# Patient Record
Sex: MALE | Race: WHITE
[De-identification: names, ages, dates, MRNs, and addresses within clinical notes are randomized per-mention and may not be internally consistent; named-entity substitution may affect disease eponyms.]

---

## 2017-09-15 ENCOUNTER — HOSPITAL ENCOUNTER (OUTPATIENT)
Dept: HOSPITAL 47 - RADCTMAIN | Age: 77
Discharge: HOME | End: 2017-09-15
Payer: COMMERCIAL

## 2017-09-15 DIAGNOSIS — C34.11: Primary | ICD-10-CM

## 2017-09-15 LAB
BUN SERPL-SCNC: 24 MG/DL (ref 9–20)
NON-AFRICAN AMERICAN GFR(MDRD): 48

## 2017-09-15 PROCEDURE — 74177 CT ABD & PELVIS W/CONTRAST: CPT

## 2017-09-15 PROCEDURE — 82565 ASSAY OF CREATININE: CPT

## 2017-09-15 PROCEDURE — 71260 CT THORAX DX C+: CPT

## 2017-09-15 PROCEDURE — 84520 ASSAY OF UREA NITROGEN: CPT

## 2017-09-15 PROCEDURE — 36415 COLL VENOUS BLD VENIPUNCTURE: CPT

## 2017-09-15 NOTE — CT
EXAMINATION TYPE: CT ChestAbdPelvis w con

 

DATE OF EXAM: 9/15/2017

 

COMPARISON: NONE

 

HISTORY: Lung cancer

 

CT DLP: 1226 mGycm. Automated Exposure Control for Dose Reduction was Utilized.

 

 

CONTRAST: 

CT scan of the thorax, abdomen and pelvis is performed with IV Contrast, patient injected with 80 ml 
mL of Visipaque 320.

 

FINDINGS:

 

LUNGS: Mild centrilobular and paraseptal emphysematous changes are appreciated. Right-sided volume lo
ss is present. Likely inspissated debris is seen within the right lateral trachea dependently on seri
es 3 image 14. There is a focal consolidation within the right lower lobe adjacent smaller consolidat
ion measured on soft tissue windows series 7 image 11 measuring 6.9 x 4.7 cm and 2.2 x 2.0 cm respect
ively. Just superior to this there is a spiculated nodule along the interlobar fissure measuring 1.2 
x 0.9 cm. Additional spiculated nodule measures approximately 0.6 x 0.9 cm on series 4 image 39 exten
ding cephalad. Rounded nodule within the right upper lobe measures 6 mm on series 4 image 30. Patient
's known lung cancer is thought to be located within the right lower lobe consolidation with numerous
 adjacent areas of spiculation that could relate to scarring and or satellite nodules. Left lung lacey
ins clear without discrete pulmonary nodule or mass.

 

MEDIASTINUM: There are no greater than 1 cm hilar or mediastinal lymph nodes.   No pericardial effusi
on is seen.  Coronary artery calcifications and mitral annulus calcifications are present.

 

LIVER/GB: No significant abnormality is appreciated.

 

PANCREAS: No significant abnormality is seen.

 

SPLEEN: No significant abnormality is seen.

 

ADRENALS: No significant abnormality is seen.

 

KIDNEYS: Numerous right-sided simple appearing renal cysts are seen as well as exophytic lower pole r
enal cysts are also seen. Protuberant area on the left superior pole anteriorly is thought to relate 
to renal lobulation.

 

BOWEL:  No significant abnormality is seen. Small hiatal hernia is present. Scattered colonic diverti
cula are present without pericolonic fat stranding.

 

GENITAL ORGANS: No gross abnormality seen.

 

LYMPH NODES: No greater than 1cm abdominal or pelvic lymph nodes are appreciated.

 

OSSEOUS STRUCTURES: No significant abnormality is seen. No suspicious osseous lesions. Mild degenerat
ryan changes of the thoracolumbar and lumbosacral spine are present.

 

OTHER: Circumferential calcifications are seen of the abdominal aorta and its branches, moderate in d
egree. Incidental note is made of bilateral retroareolar gynecomastia.

 

IMPRESSION:

1. Right lower lobe multifocal consolidation, presumed to contain the primary known lung carcinoma wi
th surrounding atelectasis and numerous areas of subcentimeter spiculation which could represent scar
ring and/or satellite pulmonary nodules. Additional more circumscribed right upper lobe 6 mm pulmonar
y nodule is seen. Comparison with any prior imaging would be of benefit to determine stability.  No l
eft-sided pulmonary nodules.

2. No evidence of mediastinal or axillary adenopathy.

3. No evidence of visceral or osseous metastasis within the abdomen or pelvis.

## 2017-10-03 ENCOUNTER — HOSPITAL ENCOUNTER (OUTPATIENT)
Dept: HOSPITAL 47 - LABWHC1 | Age: 77
Discharge: HOME | End: 2017-10-03
Payer: COMMERCIAL

## 2017-10-03 DIAGNOSIS — M54.5: ICD-10-CM

## 2017-10-03 DIAGNOSIS — Z01.812: Primary | ICD-10-CM

## 2017-10-03 LAB
BUN SERPL-SCNC: 31 MG/DL (ref 9–20)
NON-AFRICAN AMERICAN GFR(MDRD): 46

## 2017-10-03 PROCEDURE — 84520 ASSAY OF UREA NITROGEN: CPT

## 2017-10-03 PROCEDURE — 82565 ASSAY OF CREATININE: CPT

## 2017-10-03 PROCEDURE — 36415 COLL VENOUS BLD VENIPUNCTURE: CPT

## 2017-10-04 ENCOUNTER — HOSPITAL ENCOUNTER (OUTPATIENT)
Dept: HOSPITAL 47 - RADMRIMAIN | Age: 77
Discharge: HOME | End: 2017-10-04
Payer: COMMERCIAL

## 2017-10-04 ENCOUNTER — HOSPITAL ENCOUNTER (OUTPATIENT)
Dept: HOSPITAL 47 - LABWHC1 | Age: 77
Discharge: HOME | End: 2017-10-04
Payer: COMMERCIAL

## 2017-10-04 DIAGNOSIS — E55.9: ICD-10-CM

## 2017-10-04 DIAGNOSIS — M43.8X6: ICD-10-CM

## 2017-10-04 DIAGNOSIS — N40.0: ICD-10-CM

## 2017-10-04 DIAGNOSIS — E11.9: ICD-10-CM

## 2017-10-04 DIAGNOSIS — M46.86: ICD-10-CM

## 2017-10-04 DIAGNOSIS — M81.0: ICD-10-CM

## 2017-10-04 DIAGNOSIS — I10: ICD-10-CM

## 2017-10-04 DIAGNOSIS — E66.9: ICD-10-CM

## 2017-10-04 DIAGNOSIS — Z00.00: Primary | ICD-10-CM

## 2017-10-04 DIAGNOSIS — E78.5: ICD-10-CM

## 2017-10-04 DIAGNOSIS — C34.91: ICD-10-CM

## 2017-10-04 DIAGNOSIS — D64.9: ICD-10-CM

## 2017-10-04 DIAGNOSIS — M51.36: Primary | ICD-10-CM

## 2017-10-04 DIAGNOSIS — E03.9: ICD-10-CM

## 2017-10-04 LAB
ALP SERPL-CCNC: 107 U/L (ref 38–126)
ALT SERPL-CCNC: 73 U/L (ref 21–72)
ANION GAP SERPL CALC-SCNC: 12 MMOL/L
AST SERPL-CCNC: 58 U/L (ref 17–59)
BASOPHILS # BLD AUTO: 0 K/UL (ref 0–0.2)
BASOPHILS NFR BLD AUTO: 0 %
BUN SERPL-SCNC: 30 MG/DL (ref 9–20)
CALCIUM SPEC-MCNC: 9.2 MG/DL (ref 8.4–10.2)
CH: 31.5
CHCM: 32.8
CHLORIDE SERPL-SCNC: 106 MMOL/L (ref 98–107)
CHOLEST SERPL-MCNC: 194 MG/DL (ref ?–200)
CK SERPL-CCNC: 41 U/L (ref 55–170)
CO2 SERPL-SCNC: 19 MMOL/L (ref 22–30)
EOSINOPHIL # BLD AUTO: 0.4 K/UL (ref 0–0.7)
EOSINOPHIL NFR BLD AUTO: 6 %
ERYTHROCYTE [DISTWIDTH] IN BLOOD BY AUTOMATED COUNT: 3.95 M/UL (ref 4.3–5.9)
ERYTHROCYTE [DISTWIDTH] IN BLOOD: 14.1 % (ref 11.5–15.5)
GLUCOSE SERPL-MCNC: 110 MG/DL (ref 74–99)
HCT VFR BLD AUTO: 38 % (ref 39–53)
HDLC SERPL-MCNC: 39 MG/DL (ref 40–60)
HDW: 2.46
HEMOGLOBIN A1C: 5.7 % (ref 4.2–6.1)
HGB BLD-MCNC: 12.1 GM/DL (ref 13–17.5)
LUC NFR BLD AUTO: 3 %
LYMPHOCYTES # SPEC AUTO: 1.3 K/UL (ref 1–4.8)
LYMPHOCYTES NFR SPEC AUTO: 19 %
MCH RBC QN AUTO: 30.6 PG (ref 25–35)
MCHC RBC AUTO-ENTMCNC: 31.8 G/DL (ref 31–37)
MCV RBC AUTO: 96.3 FL (ref 80–100)
MONOCYTES # BLD AUTO: 0.5 K/UL (ref 0–1)
MONOCYTES NFR BLD AUTO: 7 %
NEUTROPHILS # BLD AUTO: 4.3 K/UL (ref 1.3–7.7)
NEUTROPHILS NFR BLD AUTO: 65 %
NON-AFRICAN AMERICAN GFR(MDRD): 46
POTASSIUM SERPL-SCNC: 4.8 MMOL/L (ref 3.5–5.1)
PROT SERPL-MCNC: 7.8 G/DL (ref 6.3–8.2)
PSA SERPL-MCNC: 1.42 NG/ML (ref 0–4)
SODIUM SERPL-SCNC: 137 MMOL/L (ref 137–145)
WBC # BLD AUTO: 0.19 10*3/UL
WBC # BLD AUTO: 6.6 K/UL (ref 3.8–10.6)
WBC (PEROX): 6.27

## 2017-10-04 PROCEDURE — 82550 ASSAY OF CK (CPK): CPT

## 2017-10-04 PROCEDURE — 80053 COMPREHEN METABOLIC PANEL: CPT

## 2017-10-04 PROCEDURE — 83036 HEMOGLOBIN GLYCOSYLATED A1C: CPT

## 2017-10-04 PROCEDURE — 86140 C-REACTIVE PROTEIN: CPT

## 2017-10-04 PROCEDURE — 80061 LIPID PANEL: CPT

## 2017-10-04 PROCEDURE — 82306 VITAMIN D 25 HYDROXY: CPT

## 2017-10-04 PROCEDURE — 84443 ASSAY THYROID STIM HORMONE: CPT

## 2017-10-04 PROCEDURE — 85025 COMPLETE CBC W/AUTO DIFF WBC: CPT

## 2017-10-04 PROCEDURE — 36415 COLL VENOUS BLD VENIPUNCTURE: CPT

## 2017-10-04 PROCEDURE — 84153 ASSAY OF PSA TOTAL: CPT

## 2017-10-04 PROCEDURE — 72158 MRI LUMBAR SPINE W/O & W/DYE: CPT

## 2017-10-04 PROCEDURE — 84439 ASSAY OF FREE THYROXINE: CPT

## 2017-10-04 PROCEDURE — 85652 RBC SED RATE AUTOMATED: CPT

## 2017-10-04 NOTE — MR
EXAMINATION TYPE: MR lumbar spine wo/w con

 

DATE OF EXAM: 10/4/2017

 

COMPARISON: Prior MRI unavailable for comparison at the time of dictation.

 

HISTORY: lumbago

 

TECHNIQUE: 

Multiplanar, multisequence images of the lumbar spine were acquired utilizing 12 mL intravenous Gadav
ist gadolinium contrast.    

 

L1-L2: Normal disc appearance without desiccation.  No herniation, protrusion or disc bulging.  No ca
nal stenosis is present.  Foramina are patent bilaterally.

 

L2-L3: Broad-based posterior disc bulge causes mild anterior mass effect on the thecal sac. No signif
icant foraminal encroachment or central stenosis.

 

L3-L4: Broad-based posterior disc bulge causes mild anterior mass effect on the thecal sac. Facet art
hropathy encroaches on the lateral recesses. No significant central stenosis. Short pedicles may cont
ribute to cause some mild foraminal encroachment right greater than left.

 

L4-L5: Hypertrophic change of the ligamentum flavum and facets causes encroachment on the lateral rec
esses, posterior central disc herniation deforms anterior thecal sac, there is moderate central canal
 stenosis. Circumferential extension of endplate disc complex results in bilateral foraminal encroach
ment.

 

L5-S1: Broad-based posterior disc bulge may contact the anterior thecal sac, proximal S1 nerve roots.
 Circumferential extension of endplate disc complex causes foraminal encroachment bilaterally. No sig
nificant spinal stenosis.

 

Lumbar segments are intact.  No paraspinal masses are identified.  Conus medullaris has a normal appe
arance. There is multilevel spondylosis. Retrolisthesis grade 1 L5-S1. Loss of disc height and signal
 greatest at L5-S1, vacuum phenomenon present at L4-5 and L5-S1. There is a spinal curvature. Cortica
l cyst is associated with the lower pole of the left kidney measuring approximately 2.7 cm. Cystic fo
cus suspected involving the mid pole of the right kidney but shows some intermediate signal on T2-wenceslao
ghted sequences and is incompletely evaluated, the lesion measures approximately 2.3 cm in greatest d
imension as seen. Hemangioma noted within the L2 vertebral body.

 

IMPRESSION:

Degenerative disc disease, spinal curvature, multilevel foraminal encroachment and facet arthropathy 
as described.

## 2017-11-15 ENCOUNTER — HOSPITAL ENCOUNTER (OUTPATIENT)
Dept: HOSPITAL 47 - LABWHC1 | Age: 77
Discharge: HOME | End: 2017-11-15
Payer: COMMERCIAL

## 2017-11-15 DIAGNOSIS — B15.9: Primary | ICD-10-CM

## 2017-11-15 DIAGNOSIS — B19.10: ICD-10-CM

## 2017-11-15 DIAGNOSIS — N18.3: ICD-10-CM

## 2017-11-15 PROCEDURE — 36415 COLL VENOUS BLD VENIPUNCTURE: CPT

## 2017-11-15 PROCEDURE — 86704 HEP B CORE ANTIBODY TOTAL: CPT

## 2017-11-15 PROCEDURE — 80074 ACUTE HEPATITIS PANEL: CPT

## 2017-11-15 PROCEDURE — 86706 HEP B SURFACE ANTIBODY: CPT

## 2018-01-18 ENCOUNTER — HOSPITAL ENCOUNTER (OUTPATIENT)
Dept: HOSPITAL 47 - RADMRIMAIN | Age: 78
Discharge: HOME | End: 2018-01-18
Payer: COMMERCIAL

## 2018-01-18 DIAGNOSIS — G93.5: ICD-10-CM

## 2018-01-18 DIAGNOSIS — G93.6: ICD-10-CM

## 2018-01-18 DIAGNOSIS — R90.89: ICD-10-CM

## 2018-01-18 DIAGNOSIS — C34.11: Primary | ICD-10-CM

## 2018-01-18 PROCEDURE — 70551 MRI BRAIN STEM W/O DYE: CPT

## 2018-01-18 NOTE — MR
EXAMINATION TYPE: MR brain wo con

 

DATE OF EXAM: 1/18/2018

 

COMPARISON: NONE

 

HISTORY: Dizziness and giddiness, Lung cancer

 

T1-weighted sagittal, T2, FLAIR, and diffusion axial, and T2 coronal coronal views of the brain are s
ubmitted.  

 

There is no evidence of acute ischemia.

There is a large area of vasogenic edema compression portions of the lateral ventricle within the lef
t temporal and parietal lobes. There is a suspected mass measuring 1.8 cm at the left temporal pariet
al junction.

 

Additional numerous areas of abnormal signal the white matter are nonspecific.

Craniocervical junction maintained. Sella turcica has a normal appearance. Mild to moderate generaliz
ed degenerative change. Changes of chronic sinusitis noted.

 

 

IMPRESSION:

1. Large area of vasogenic edema involving the left temporal and parietal lobes with suspected 1.8 cm
 mass. There is mild compression of the left lateral ventricle. Recommend post contrast imaging for m
ore accurate depiction of the mass as well as any possible additional smaller lesions. Cannot exclude
 a 1 to 2 mm left-to-right midline shift. Report immediately called to the physician's office.

2. Degenerative and nonspecific white matter changes most typical of remote microvascular ischemia.

 

A Orange message has been communicated to Aiem Hudson MD via the [employer] | Critical Result syst
em on 1/18/2018 12:33 PM, Message ID [id number].

## 2018-01-20 ENCOUNTER — HOSPITAL ENCOUNTER (OUTPATIENT)
Dept: HOSPITAL 47 - RADMRIMAIN | Age: 78
Discharge: HOME | End: 2018-01-20
Attending: RADIOLOGY
Payer: COMMERCIAL

## 2018-01-20 DIAGNOSIS — C79.31: Primary | ICD-10-CM

## 2018-01-20 PROCEDURE — 70552 MRI BRAIN STEM W/DYE: CPT

## 2018-01-21 NOTE — MR
EXAMINATION TYPE: MR brain w con

 

DATE OF EXAM: 1/20/2018

 

COMPARISON: January 18, 2018

 

HISTORY: Lung cancer with concern for metastasis to the brain.

 

TECHNIQUE: 

Multiplanar, multisequence images of the brain and brainstem is performed without and with IV contras
t, utilizing 10 mL intravenous Gadavist .

 

FINDINGS: 

 

Sagittal coronal and axial postcontrast images were obtained.

 

Large area of vasogenic edema is again noted in the posterior left temporal lobe as well as the parie
alyson lobe. There is an enhancing intra-axial focus identified at the periphery of the edema which is l
ocated within the inferior aspect of the parietal lobe/superior aspect of the posterior temporal lobe
. This is best visualized on image 29 of 60 of the axial postcontrast images. This area measures 2.0 
x 2.0 x 1.8 cm. There is mass effect on the left lateral ventricle. There is no significant midline s
hift although there could be one or 2 mm. The effect of this midline shift is unchanged from compared
 to the previous examination.

 

Another focus of abnormal enhancement is identified in the right frontal lobe. This focus measures 0.
8 x 0.4 x 0.6 cm. This is best seen on the axial postcontrast enhanced sequences image 41 of 60. This
 focus is felt to be extra-axial especially given its appearance on the sagittal images, image 110 of
 173. There is no significant edema or mass effect surrounding this focus.

 

Other findings which have been previously described in the noncontrast MRI are unchanged.

 

IMPRESSION: 

 

2.0 cm enhancing intra-axial lesion is identified in the inferior parietal lobe/superior posterior te
mporal lobe.

 

0.8 cm focus is identified in the right frontal lobe which is felt to be extra-axial.

## 2018-02-11 ENCOUNTER — HOSPITAL ENCOUNTER (INPATIENT)
Dept: HOSPITAL 47 - EC | Age: 78
LOS: 5 days | Discharge: NURSING, REHAB OR LONG-TERM CARE | DRG: 280 | End: 2018-02-16
Payer: MEDICARE

## 2018-02-11 VITALS — BODY MASS INDEX: 44.6 KG/M2

## 2018-02-11 DIAGNOSIS — Z86.711: ICD-10-CM

## 2018-02-11 DIAGNOSIS — T38.0X5A: ICD-10-CM

## 2018-02-11 DIAGNOSIS — I21.4: Primary | ICD-10-CM

## 2018-02-11 DIAGNOSIS — E11.65: ICD-10-CM

## 2018-02-11 DIAGNOSIS — Z92.3: ICD-10-CM

## 2018-02-11 DIAGNOSIS — B17.10: ICD-10-CM

## 2018-02-11 DIAGNOSIS — R27.8: ICD-10-CM

## 2018-02-11 DIAGNOSIS — Z86.718: ICD-10-CM

## 2018-02-11 DIAGNOSIS — I95.9: ICD-10-CM

## 2018-02-11 DIAGNOSIS — N40.0: ICD-10-CM

## 2018-02-11 DIAGNOSIS — C79.31: ICD-10-CM

## 2018-02-11 DIAGNOSIS — I50.33: ICD-10-CM

## 2018-02-11 DIAGNOSIS — K21.9: ICD-10-CM

## 2018-02-11 DIAGNOSIS — Z79.891: ICD-10-CM

## 2018-02-11 DIAGNOSIS — K64.9: ICD-10-CM

## 2018-02-11 DIAGNOSIS — M19.91: ICD-10-CM

## 2018-02-11 DIAGNOSIS — Z86.19: ICD-10-CM

## 2018-02-11 DIAGNOSIS — G93.6: ICD-10-CM

## 2018-02-11 DIAGNOSIS — Z79.899: ICD-10-CM

## 2018-02-11 DIAGNOSIS — E86.0: ICD-10-CM

## 2018-02-11 DIAGNOSIS — D69.6: ICD-10-CM

## 2018-02-11 DIAGNOSIS — D64.81: ICD-10-CM

## 2018-02-11 DIAGNOSIS — E11.22: ICD-10-CM

## 2018-02-11 DIAGNOSIS — K76.0: ICD-10-CM

## 2018-02-11 DIAGNOSIS — N18.3: ICD-10-CM

## 2018-02-11 DIAGNOSIS — E21.3: ICD-10-CM

## 2018-02-11 DIAGNOSIS — Z79.01: ICD-10-CM

## 2018-02-11 DIAGNOSIS — J44.9: ICD-10-CM

## 2018-02-11 DIAGNOSIS — I48.0: ICD-10-CM

## 2018-02-11 DIAGNOSIS — N17.9: ICD-10-CM

## 2018-02-11 DIAGNOSIS — K59.00: ICD-10-CM

## 2018-02-11 DIAGNOSIS — M48.061: ICD-10-CM

## 2018-02-11 DIAGNOSIS — G89.29: ICD-10-CM

## 2018-02-11 DIAGNOSIS — E78.5: ICD-10-CM

## 2018-02-11 DIAGNOSIS — F17.200: ICD-10-CM

## 2018-02-11 DIAGNOSIS — I13.0: ICD-10-CM

## 2018-02-11 DIAGNOSIS — Z85.118: ICD-10-CM

## 2018-02-11 DIAGNOSIS — I83.90: ICD-10-CM

## 2018-02-11 DIAGNOSIS — F10.10: ICD-10-CM

## 2018-02-11 DIAGNOSIS — Z92.21: ICD-10-CM

## 2018-02-11 DIAGNOSIS — B15.9: ICD-10-CM

## 2018-02-11 DIAGNOSIS — N28.1: ICD-10-CM

## 2018-02-11 DIAGNOSIS — M10.9: ICD-10-CM

## 2018-02-11 LAB
ALBUMIN SERPL-MCNC: 3.1 G/DL (ref 3.5–5)
ALP SERPL-CCNC: 72 U/L (ref 38–126)
ALT SERPL-CCNC: 102 U/L (ref 21–72)
AMYLASE SERPL-CCNC: 66 U/L (ref 30–110)
ANION GAP SERPL CALC-SCNC: 13 MMOL/L
APTT BLD: 20.8 SEC (ref 22–30)
AST SERPL-CCNC: 55 U/L (ref 17–59)
BASOPHILS # BLD AUTO: 0 K/UL (ref 0–0.2)
BASOPHILS NFR BLD AUTO: 0 %
BUN SERPL-SCNC: 70 MG/DL (ref 9–20)
CALCIUM SPEC-MCNC: 8.4 MG/DL (ref 8.4–10.2)
CHLORIDE SERPL-SCNC: 101 MMOL/L (ref 98–107)
CK SERPL-CCNC: 36 U/L (ref 55–170)
CK SERPL-CCNC: 38 U/L (ref 55–170)
CO2 SERPL-SCNC: 22 MMOL/L (ref 22–30)
EOSINOPHIL # BLD AUTO: 0 K/UL (ref 0–0.7)
EOSINOPHIL NFR BLD AUTO: 0 %
ERYTHROCYTE [DISTWIDTH] IN BLOOD BY AUTOMATED COUNT: 3.89 M/UL (ref 4.3–5.9)
ERYTHROCYTE [DISTWIDTH] IN BLOOD: 13 % (ref 11.5–15.5)
GLUCOSE SERPL-MCNC: 150 MG/DL (ref 74–99)
HCT VFR BLD AUTO: 36.7 % (ref 39–53)
HGB BLD-MCNC: 12.3 GM/DL (ref 13–17.5)
INR PPP: 1.1 (ref ?–1.2)
LIPASE SERPL-CCNC: 28 U/L (ref 23–300)
LYMPHOCYTES # SPEC AUTO: 0.4 K/UL (ref 1–4.8)
LYMPHOCYTES NFR SPEC AUTO: 4 %
MAGNESIUM SPEC-SCNC: 2.2 MG/DL (ref 1.6–2.3)
MCH RBC QN AUTO: 31.6 PG (ref 25–35)
MCHC RBC AUTO-ENTMCNC: 33.5 G/DL (ref 31–37)
MCV RBC AUTO: 94.3 FL (ref 80–100)
MONOCYTES # BLD AUTO: 0.2 K/UL (ref 0–1)
MONOCYTES NFR BLD AUTO: 2 %
NEUTROPHILS # BLD AUTO: 8.2 K/UL (ref 1.3–7.7)
NEUTROPHILS NFR BLD AUTO: 93 %
PH UR: 5 [PH] (ref 5–8)
PLATELET # BLD AUTO: 107 K/UL (ref 150–450)
POTASSIUM SERPL-SCNC: 4.8 MMOL/L (ref 3.5–5.1)
PROT SERPL-MCNC: 5.6 G/DL (ref 6.3–8.2)
PT BLD: 10.3 SEC (ref 9–12)
SODIUM SERPL-SCNC: 136 MMOL/L (ref 137–145)
SP GR UR: 1.01 (ref 1–1.03)
TROPONIN I SERPL-MCNC: 0.23 NG/ML (ref 0–0.03)
TROPONIN I SERPL-MCNC: 0.26 NG/ML (ref 0–0.03)
UROBILINOGEN UR QL STRIP: <2 MG/DL (ref ?–2)
WBC # BLD AUTO: 8.7 K/UL (ref 3.8–10.6)

## 2018-02-11 PROCEDURE — 83690 ASSAY OF LIPASE: CPT

## 2018-02-11 PROCEDURE — 84550 ASSAY OF BLOOD/URIC ACID: CPT

## 2018-02-11 PROCEDURE — 83970 ASSAY OF PARATHORMONE: CPT

## 2018-02-11 PROCEDURE — 80076 HEPATIC FUNCTION PANEL: CPT

## 2018-02-11 PROCEDURE — 93005 ELECTROCARDIOGRAM TRACING: CPT

## 2018-02-11 PROCEDURE — 80074 ACUTE HEPATITIS PANEL: CPT

## 2018-02-11 PROCEDURE — 81003 URINALYSIS AUTO W/O SCOPE: CPT

## 2018-02-11 PROCEDURE — 83036 HEMOGLOBIN GLYCOSYLATED A1C: CPT

## 2018-02-11 PROCEDURE — 82977 ASSAY OF GGT: CPT

## 2018-02-11 PROCEDURE — 80053 COMPREHEN METABOLIC PANEL: CPT

## 2018-02-11 PROCEDURE — 99291 CRITICAL CARE FIRST HOUR: CPT

## 2018-02-11 PROCEDURE — 83880 ASSAY OF NATRIURETIC PEPTIDE: CPT

## 2018-02-11 PROCEDURE — 36415 COLL VENOUS BLD VENIPUNCTURE: CPT

## 2018-02-11 PROCEDURE — 83735 ASSAY OF MAGNESIUM: CPT

## 2018-02-11 PROCEDURE — 82150 ASSAY OF AMYLASE: CPT

## 2018-02-11 PROCEDURE — 82550 ASSAY OF CK (CPK): CPT

## 2018-02-11 PROCEDURE — 80048 BASIC METABOLIC PNL TOTAL CA: CPT

## 2018-02-11 PROCEDURE — 96360 HYDRATION IV INFUSION INIT: CPT

## 2018-02-11 PROCEDURE — 74018 RADEX ABDOMEN 1 VIEW: CPT

## 2018-02-11 PROCEDURE — 96361 HYDRATE IV INFUSION ADD-ON: CPT

## 2018-02-11 PROCEDURE — 85730 THROMBOPLASTIN TIME PARTIAL: CPT

## 2018-02-11 PROCEDURE — 85025 COMPLETE CBC W/AUTO DIFF WBC: CPT

## 2018-02-11 PROCEDURE — 76700 US EXAM ABDOM COMPLETE: CPT

## 2018-02-11 PROCEDURE — 71046 X-RAY EXAM CHEST 2 VIEWS: CPT

## 2018-02-11 PROCEDURE — 85610 PROTHROMBIN TIME: CPT

## 2018-02-11 PROCEDURE — 94640 AIRWAY INHALATION TREATMENT: CPT

## 2018-02-11 PROCEDURE — 80061 LIPID PANEL: CPT

## 2018-02-11 PROCEDURE — 82553 CREATINE MB FRACTION: CPT

## 2018-02-11 PROCEDURE — 84484 ASSAY OF TROPONIN QUANT: CPT

## 2018-02-11 PROCEDURE — 93306 TTE W/DOPPLER COMPLETE: CPT

## 2018-02-11 RX ADMIN — CARVEDILOL SCH MG: 12.5 TABLET, FILM COATED ORAL at 20:23

## 2018-02-11 RX ADMIN — OXYCODONE AND ACETAMINOPHEN SCH EACH: 5; 325 TABLET ORAL at 21:13

## 2018-02-11 RX ADMIN — APIXABAN SCH MG: 5 TABLET, FILM COATED ORAL at 20:23

## 2018-02-11 RX ADMIN — TAMSULOSIN HYDROCHLORIDE SCH MG: 0.4 CAPSULE ORAL at 20:23

## 2018-02-11 RX ADMIN — ATORVASTATIN CALCIUM SCH MG: 20 TABLET, FILM COATED ORAL at 20:23

## 2018-02-11 RX ADMIN — ALLOPURINOL SCH MG: 100 TABLET ORAL at 18:03

## 2018-02-11 NOTE — ED
SOB HPI





- General


Chief Complaint: Shortness of Breath


Stated Complaint: Flu


Time Seen by Provider: 02/11/18 12:07


Source: patient, RN notes reviewed


Mode of arrival: wheelchair


Limitations: no limitations





- History of Present Illness


Initial Comments: 





This is a 77-year-old male who presents with complaints of generalized weakness 

not feeling well he relates that started one week ago he had diarrhea no nausea 

or vomiting however he has exertional dyspnea and sensing no energy.  He denies 

any overt chest pain dysuria hematuria no rhinorrhea no cough ear ear pain.  He 

does state he was started on dexamethasone 2 weeks ago by his doctor.  After 

this he was diagnosed with gastritis and placed on omeprazole.  He did have 

epigastric burning epigastric discomfort.


MD Complaint: shortness of breath





- Related Data


 Home Medications











 Medication  Instructions  Recorded  Confirmed


 


Allopurinol [Zyloprim] 100 mg PO Q48H 02/11/18 02/11/18


 


Apixaban [Eliquis] 5 mg PO BID 02/11/18 02/11/18


 


Atorvastatin [Lipitor] 20 mg PO HS 02/11/18 02/11/18


 


Carvedilol [Coreg] 12.5 mg PO BID 02/11/18 02/11/18


 


Chlorthalidone 25 mg PO DAILY 02/11/18 02/11/18


 


Losartan Potassium [Cozaar] 100 mg PO DAILY 02/11/18 02/11/18


 


Tamsulosin [Flomax] 0.4 mg PO HS 02/11/18 02/11/18


 


amLODIPine [Norvasc] 5 mg PO HS 02/11/18 02/11/18


 


oxyCODONE-APAP 5-325MG [Percocet 1 tab PO TID 02/11/18 02/11/18





5-325 mg]   











 Allergies











Allergy/AdvReac Type Severity Reaction Status Date / Time


 


No Known Allergies Allergy   Verified 02/11/18 12:45














Review of Systems


ROS Statement: 


Those systems with pertinent positive or pertinent negative responses have been 

documented in the HPI.





ROS Other: All systems not noted in ROS Statement are negative.





Past Medical History


Past Medical History: Renal Disease


Additional Past Medical History / Comment(s): lung ca, brain ca, spinal stenosis

, kidney stenosis, hep a and hepb.  last chemo january 12  and radiation last 

week was schedukled, did not go


History of Any Multi-Drug Resistant Organisms: Other MDRO


Date of last positivie culture/infection: 3 months ago


MDRO Source:: HEP A


Past Surgical History: Hernia Repair


Additional Past Surgical History / Comment(s): rotator cuff


Past Psychological History: No Psychological Hx Reported


Past Alcohol Use History: None Reported


Past Drug Use History: None Reported





General Exam





- General Exam Comments


Initial Comments: 





This is a well-developed well-nourished awake alert oriented times 3 male


Limitations: no limitations


General appearance: alert, lethargic


Head exam: Present: atraumatic, normocephalic, normal inspection


Eye exam: Present: normal appearance, PERRL, EOMI.  Absent: scleral icterus, 

conjunctival injection, periorbital swelling


ENT exam: Present: normal exam, mucous membranes moist


Neck exam: Present: normal inspection.  Absent: tenderness, meningismus, 

lymphadenopathy


Respiratory exam: Present: wheezes (Occasional right lower lobe wheezing), 

decreased breath sounds.  Absent: respiratory distress, rales, rhonchi, stridor


Cardiovascular Exam: Present: regular rate, normal rhythm, normal heart sounds.

  Absent: systolic murmur, diastolic murmur, rubs, gallop, clicks


GI/Abdominal exam: Present: soft, normal bowel sounds.  Absent: distended, 

tenderness, guarding, rebound, rigid


Rectal exam: Present: deferred


Extremities exam: Present: normal inspection, full ROM, normal capillary 

refill.  Absent: tenderness, pedal edema, joint swelling, calf tenderness


Back exam: Present: normal inspection


Neurological exam: Present: alert, oriented X3, CN II-XII intact


Psychiatric exam: Present: normal affect, normal mood


Skin exam: Present: warm, dry, intact, normal color.  Absent: rash





Course


 Vital Signs











  02/11/18 02/11/18 02/11/18





  11:47 13:06 13:54


 


Temperature 98.2 F  


 


Pulse Rate 83 70 65


 


Respiratory 18 20 18





Rate   


 


Blood Pressure 141/66  135/65


 


O2 Sat by Pulse 100 99 98





Oximetry   














  02/11/18 02/11/18





  14:46 15:45


 


Temperature  


 


Pulse Rate 68 66


 


Respiratory 18 18





Rate  


 


Blood Pressure 113/54 114/56


 


O2 Sat by Pulse 99 99





Oximetry  














- Reevaluation(s)


Reevaluation #1: 





02/11/18 16:06


I did reevaluate patient on 2 occasions he has no acute changes.  He feels 

neither better or worse.





Medical Decision Making





- Medical Decision Making





I did discuss the findings with the patient and his family.  Patient will be 

admitted with consultation by cardiology.  He does have evidence of CHF and non-

ST elevation myocardial infarction with an elevated troponin.  He also does 

demonstrate evidence of some prerenal azotemia





- Lab Data


Result diagrams: 


 02/11/18 12:34





 02/11/18 12:34


 Lab Results











  02/11/18 02/11/18 02/11/18 Range/Units





  12:34 12:34 12:34 


 


WBC    8.7  (3.8-10.6)  k/uL


 


RBC    3.89 L  (4.30-5.90)  m/uL


 


Hgb    12.3 L  (13.0-17.5)  gm/dL


 


Hct    36.7 L  (39.0-53.0)  %


 


MCV    94.3  (80.0-100.0)  fL


 


MCH    31.6  (25.0-35.0)  pg


 


MCHC    33.5  (31.0-37.0)  g/dL


 


RDW    13.0  (11.5-15.5)  %


 


Plt Count    107 L  (150-450)  k/uL


 


Neutrophils %    93  %


 


Lymphocytes %    4  %


 


Monocytes %    2  %


 


Eosinophils %    0  %


 


Basophils %    0  %


 


Neutrophils #    8.2 H  (1.3-7.7)  k/uL


 


Lymphocytes #    0.4 L  (1.0-4.8)  k/uL


 


Monocytes #    0.2  (0-1.0)  k/uL


 


Eosinophils #    0.0  (0-0.7)  k/uL


 


Basophils #    0.0  (0-0.2)  k/uL


 


PT     (9.0-12.0)  sec


 


INR     (<1.2)  


 


APTT     (22.0-30.0)  sec


 


Sodium  136 L    (137-145)  mmol/L


 


Potassium  4.8    (3.5-5.1)  mmol/L


 


Chloride  101    ()  mmol/L


 


Carbon Dioxide  22    (22-30)  mmol/L


 


Anion Gap  13    mmol/L


 


BUN  70 H    (9-20)  mg/dL


 


Creatinine  1.23    (0.66-1.25)  mg/dL


 


Est GFR (MDRD) Af Amer  >60    (>60 ml/min/1.73 sqM)  


 


Est GFR (MDRD) Non-Af  57    (>60 ml/min/1.73 sqM)  


 


Glucose  150 H    (74-99)  mg/dL


 


Calcium  8.4    (8.4-10.2)  mg/dL


 


Magnesium  2.2    (1.6-2.3)  mg/dL


 


Total Bilirubin  0.6    (0.2-1.3)  mg/dL


 


AST  55    (17-59)  U/L


 


ALT  102 H    (21-72)  U/L


 


Alkaline Phosphatase  72    ()  U/L


 


Total Creatine Kinase   38 L   ()  U/L


 


CK-MB (CK-2)   8.6 H*   (0.0-2.4)  ng/mL


 


CK-MB (CK-2) Rel Index   22.6   


 


Troponin I   0.261 H*   (0.000-0.034)  ng/mL


 


NT-Pro-B Natriuret Pep     pg/mL


 


Total Protein  5.6 L    (6.3-8.2)  g/dL


 


Albumin  3.1 L    (3.5-5.0)  g/dL


 


Amylase  66    ()  U/L


 


Lipase  28    ()  U/L














  02/11/18 02/11/18 Range/Units





  12:34 12:34 


 


WBC    (3.8-10.6)  k/uL


 


RBC    (4.30-5.90)  m/uL


 


Hgb    (13.0-17.5)  gm/dL


 


Hct    (39.0-53.0)  %


 


MCV    (80.0-100.0)  fL


 


MCH    (25.0-35.0)  pg


 


MCHC    (31.0-37.0)  g/dL


 


RDW    (11.5-15.5)  %


 


Plt Count    (150-450)  k/uL


 


Neutrophils %    %


 


Lymphocytes %    %


 


Monocytes %    %


 


Eosinophils %    %


 


Basophils %    %


 


Neutrophils #    (1.3-7.7)  k/uL


 


Lymphocytes #    (1.0-4.8)  k/uL


 


Monocytes #    (0-1.0)  k/uL


 


Eosinophils #    (0-0.7)  k/uL


 


Basophils #    (0-0.2)  k/uL


 


PT   10.3  (9.0-12.0)  sec


 


INR   1.1  (<1.2)  


 


APTT   20.8 L  (22.0-30.0)  sec


 


Sodium    (137-145)  mmol/L


 


Potassium    (3.5-5.1)  mmol/L


 


Chloride    ()  mmol/L


 


Carbon Dioxide    (22-30)  mmol/L


 


Anion Gap    mmol/L


 


BUN    (9-20)  mg/dL


 


Creatinine    (0.66-1.25)  mg/dL


 


Est GFR (MDRD) Af Amer    (>60 ml/min/1.73 sqM)  


 


Est GFR (MDRD) Non-Af    (>60 ml/min/1.73 sqM)  


 


Glucose    (74-99)  mg/dL


 


Calcium    (8.4-10.2)  mg/dL


 


Magnesium    (1.6-2.3)  mg/dL


 


Total Bilirubin    (0.2-1.3)  mg/dL


 


AST    (17-59)  U/L


 


ALT    (21-72)  U/L


 


Alkaline Phosphatase    ()  U/L


 


Total Creatine Kinase    ()  U/L


 


CK-MB (CK-2)    (0.0-2.4)  ng/mL


 


CK-MB (CK-2) Rel Index    


 


Troponin I    (0.000-0.034)  ng/mL


 


NT-Pro-B Natriuret Pep  4270   pg/mL


 


Total Protein    (6.3-8.2)  g/dL


 


Albumin    (3.5-5.0)  g/dL


 


Amylase    ()  U/L


 


Lipase    ()  U/L














- EKG Data


-: EKG Interpreted by Me


EKG shows normal: sinus rhythm (Sinus rhythm rate of 69 QRS 80 daily since QTC 

of 42/4:30 nonspecific ST configuration)





- Radiology Data


Radiology results: report reviewed (I did review the imaging and reports COPD 

right-sided pleural reaction no acute findings otherwise.), image reviewed





Critical Care Time


Critical Care Time: Yes


Critical Care Time: 





31 minutes of critical care time which includes initial presentation with 

history physical labs x-rays reevaluation the patient discussed with the 

patient and family regarding the findings review charting that was available.  

Discussion with the admitting physician and documentation thereof





Disposition


Clinical Impression: 


 Non-ST elevation myocardial infarction (NSTEMI), Congestive heart failure, 

Generalized weakness





Disposition: ADMITTED AS IP TO THIS [facility]


Condition: Stable


Referrals: 


Prabhakar Claire MD [Primary Care Provider] - 1-2 days

## 2018-02-11 NOTE — XR
EXAMINATION TYPE: XR chest 2V

 

DATE OF EXAM: 2/11/2018

 

HISTORY: difficulty breathing.

 

REFERENCE: NONE.

 

FINDINGS: The lungs are overinflated. The heart is mildly enlarged. There is right-sided pleural reac
tion.

 

IMPRESSION: 

1. COPD.

2. RIGHT-SIDED PLEURAL REACTION, LIKELY REPRESENTING FLUID.

3. MILD CARDIOMEGALY.

## 2018-02-11 NOTE — XR
EXAMINATION TYPE: XR abdomen 1V , 2 VIEWS

 

DATE OF EXAM ORDERED: 2/11/2018

 

HISTORY: Pain.

 

COMPARISON: None.

 

FINDINGS:  There is an S-shaped scoliosis convex to the right in the thoracic lumbar junction and to 
the left in the lumbar spine.

 

There is a small right-sided pleural reaction, likely representing fluid. The lungs are overinflated.
 The heart is upper limits of normal in size.

 

Within the abdomen, the abdominal gas pattern is within normal limits. There is no evidence of obstru
ction or free air. There are vascular calcifications within the pelvis.

 

IMPRESSION: 

1. COPD.

2. BORDERLINE CARDIOMEGALY.

3. SMALL RIGHT-SIDED EFFUSION.

4. NO ACUTE INTRA-ABDOMINAL ABNORMALITY.

## 2018-02-11 NOTE — P.HPIM
History of Present Illness





Chief complaint


Generally feeling weak which shortness of breath.





History of present illness


The patient is a 77-year-old gentleman who is a patient of Dr. Claire for whom 

I am covering.  Patient states 1 week ago he developed loose stool and 

generalized weakness.  He is a patient who has apparently lung cancer with 

metastatic disease to the brain and is apparently on radiation treatment and 

medications through out of town oncologists.  Apparently he had a course of 

dexamethasone and subsequently developed some chest discomfort which stool was 

described as epigastric and chest burning usually with and after food.  Patient 

was apparently started on omeprazole which did clear his symptomatology.  But 

weakness and diarrhea has persisted along with increasing shortness of breath 

and he presented to the emergency room today.





Past medical history


Patient gives a history that approximately 2 years ago he was diagnosed in 

[place] with lung cancer.  Apparently he more recently moved back to 

[place] and has been undergoing treatment for metastatic disease to the brain.

  He has received chemotherapy and apparently radiation treatments.


Patient also has history of lumbar spinal stenosis.


Apparently also has had history of hepatitis A and hepatitis B infections in 

the past although he is not aware of any of the details.


Patient also has hyperlipidemia


History of hypertension


Patient is also on Eliquis


Patient denies any previous myocardial infarctions.


Patient also has had hernia repair and rotator cuff repair.





No known ALLERGIES





Home medications


Coreg 12.5 twice a day


Flomax 0.4 mg at at bedtime


Cozaar 100 mg daily


Lipitor 20 mg at at bedtime


Allopurinol 100 mg every 48 hours


Chlorthalidone 25 mg daily


Eliquis 5 mg twice a day


Percocet 5-325 one 3 times a day


Norvasc 5 mg at at bedtime





Review of systems


As mentioned in history of present illness.  He denied any new visual changes.  

No vomiting.  No fever or chills.  No new urinary symptoms.  He has been having 

2 loose bowel movements daily.  No definite hematochezia.


No cough or phlegm production.





Family history


Noncontributory





Social history


Patient states he was living in an assisted living home in [place] and could 

not afford to stay there.  Apparently he does have some family in the area that 

brought him back to [place].





Physical examination


Patient is overall alert and oriented.


Vital signs reveal temperature 90.8 with a pulse of 73 and respirations 16.  

Blood pressure 149/67 and he is 98% saturated on 2 L nasal cannula.


Extraocular movements are intact.  Neck is supple.


Lungs are clear.


Heart tones regular without definitive murmur.


Abdomen is distended.  Overall soft though.  Bowel sounds present.  No rebound 

guarding or masses detected.


Rectal exam deferred.


Extremities did not reveal any edema.


Neurologic exam he is alert and oriented.  Cranial nerves are intact.  No focal 

weakness noted.





Laboratory


White count is 8.7 with a hemoglobin 12.3 and a platelet count 107.


INR is 1.1 with a PTT of 20.8


Sodium is 136 with a potassium 4.8.  BUN was 70 with a creatinine 1.23.  GFR of 

57


Blood sugar 150.


ALT is elevated at 102


Troponin also was found to be elevated 0.261 with a BNP of 4270.


Albumin is low at 3.1 with a total protein of 5.6.





EKG was reported as an accelerated junctional rhythm although I do think there 

are P waves present on my examination.  He does have some lateral ST-T wave 

changes.





Chest x-ray showed evidence of COPD and a right-sided pleural reaction with 

mild cardiomegaly.





Abdominal x-rays did not show any acute intra-abdominal abnormality.





Impressions


1.  The patient has been having rather extreme weakness.  Patient does have 

some EKG changes and elevated troponin.  Presently we do not have an old EKG to 

compare.  Rule out underlying cardiac ischemia.  Patient did have some chest 

pain over a week prior to presentation which appears to be related to reflux 

disease.  This also did resolve with proton pump inhibitor.


2.  Acute renal failure with elevated BUN and GFR of 57.  Likely related to 

prerenal azotemia with decreased perfusion secondary to poor intake and 

diarrhea.


3.  History of lung cancer with metastatic disease to the brain.  Exact cell 

type and further history not completely clear at this time.


4.  Apparent history of hypertension.


5.  Patient on Eliquis


6.  Hyperlipidemia


7.  Apparent history of hepatitis, viral.


8.  History of lumbar stenosis





Plans


Patient has generally been continued on his cardiac medications.


We will hold Lasix and obtain echocardiogram.


Cardiology consult.


Gentle hydration.  Follow-up labs.


Follow-up troponins and EKG.


Monitor patient.


Patient's primary PCP Dr. Claire to resume care tomorrow.





Past Medical History


Past Medical History: Cancer, Hyperlipidemia, Hypertension, Pulmonary Embolus (

PE), Renal Disease


Additional Past Medical History / Comment(s): lung ca,stage 3 kidney brain ca, 

spinal stenosis, kidney stenosis, hep a and hepb.  last chemo   and 

radiation last week was scheduled, did not go, gout.


History of Any Multi-Drug Resistant Organisms: Other MDRO


Date of last positivie culture/infection: 3 months ago


MDRO Source:: HEP A


Past Surgical History: Hernia Repair


Additional Past Surgical History / Comment(s): rotator cuff


Past Psychological History: No Psychological Hx Reported


Smoking Status: Current every day smoker


Past Alcohol Use History: None Reported


Additional Past Alcohol Use History / Comment(s): Patient has one cocktail 

every night around 5pm.


Past Drug Use History: None Reported





- Past Family History


  ** Mother


Family Medical History: Cancer


Additional Family Medical History / Comment(s):  of uterine cancer





  ** Father


Family Medical History: No Reported History





Medications and Allergies


 Home Medications











 Medication  Instructions  Recorded  Confirmed  Type


 


Allopurinol [Zyloprim] 100 mg PO Q48H 18 History


 


Apixaban [Eliquis] 5 mg PO BID 18 History


 


Atorvastatin [Lipitor] 20 mg PO HS 18 History


 


Carvedilol [Coreg] 12.5 mg PO BID 18 History


 


Chlorthalidone 25 mg PO DAILY 18 History


 


Losartan Potassium [Cozaar] 100 mg PO DAILY 18 History


 


Tamsulosin [Flomax] 0.4 mg PO HS 18 History


 


amLODIPine [Norvasc] 5 mg PO HS 18 History


 


oxyCODONE-APAP 5-325MG [Percocet 1 tab PO TID 18 History





5-325 mg]    











 Allergies











Allergy/AdvReac Type Severity Reaction Status Date / Time


 


No Known Allergies Allergy   Verified 18 12:45














Physical Exam


Vitals: 


 Vital Signs











  Temp Pulse Pulse Resp BP BP Pulse Ox


 


 18 18:15    78  16   


 


 18 17:05  98.0 F  73   16  149/67   98


 


 18 16:36  97.3 F L   78  18   130/59  100


 


 18 15:45   66   18  114/56   99


 


 18 14:46   68   18  113/54   99


 


 18 13:54   65   18  135/65   98


 


 18 13:06   70   20    99


 


 18 11:47  98.2 F  83   18  141/66   100








 Intake and Output











 18





 06:59 14:59 22:59


 


Intake Total   180


 


Balance   180


 


Intake:   


 


  Oral   180


 


Other:   


 


  Weight  104.326 kg 104 kg








 Patient Weight











 18





 06:59


 


Weight 104 kg














Results


CBC & Chem 7: 


 18 12:34





 18 12:34


Labs: 


 Abnormal Lab Results - Last 24 Hours (Table)











  18 Range/Units





  12:34 12:34 12:34 


 


RBC    3.89 L  (4.30-5.90)  m/uL


 


Hgb    12.3 L  (13.0-17.5)  gm/dL


 


Hct    36.7 L  (39.0-53.0)  %


 


Plt Count    107 L  (150-450)  k/uL


 


Neutrophils #    8.2 H  (1.3-7.7)  k/uL


 


Lymphocytes #    0.4 L  (1.0-4.8)  k/uL


 


APTT     (22.0-30.0)  sec


 


Sodium  136 L    (137-145)  mmol/L


 


BUN  70 H    (9-20)  mg/dL


 


Glucose  150 H    (74-99)  mg/dL


 


ALT  102 H    (21-72)  U/L


 


Total Creatine Kinase   38 L   ()  U/L


 


CK-MB (CK-2)   8.6 H*   (0.0-2.4)  ng/mL


 


Troponin I   0.261 H*   (0.000-0.034)  ng/mL


 


Total Protein  5.6 L    (6.3-8.2)  g/dL


 


Albumin  3.1 L    (3.5-5.0)  g/dL














  18 Range/Units





  12:34 


 


RBC   (4.30-5.90)  m/uL


 


Hgb   (13.0-17.5)  gm/dL


 


Hct   (39.0-53.0)  %


 


Plt Count   (150-450)  k/uL


 


Neutrophils #   (1.3-7.7)  k/uL


 


Lymphocytes #   (1.0-4.8)  k/uL


 


APTT  20.8 L  (22.0-30.0)  sec


 


Sodium   (137-145)  mmol/L


 


BUN   (9-20)  mg/dL


 


Glucose   (74-99)  mg/dL


 


ALT   (21-72)  U/L


 


Total Creatine Kinase   ()  U/L


 


CK-MB (CK-2)   (0.0-2.4)  ng/mL


 


Troponin I   (0.000-0.034)  ng/mL


 


Total Protein   (6.3-8.2)  g/dL


 


Albumin   (3.5-5.0)  g/dL














Thrombosis Risk Factor Assmnt





- Choose All That Apply


Any of the Below Risk Factors Present?: Yes


Each Risk Factor Represents 3 Points: Age 75 years or older, History of DVT/PE


Thrombosis Risk Factor Assessment Total Risk Factor Score: 6


Thrombosis Risk Factor Assessment Level: High Risk

## 2018-02-12 LAB
ALBUMIN SERPL-MCNC: 3.2 G/DL (ref 3.5–5)
ALP SERPL-CCNC: 78 U/L (ref 38–126)
ALT SERPL-CCNC: 103 U/L (ref 21–72)
ANION GAP SERPL CALC-SCNC: 11 MMOL/L
AST SERPL-CCNC: 46 U/L (ref 17–59)
BASOPHILS # BLD AUTO: 0 K/UL (ref 0–0.2)
BASOPHILS NFR BLD AUTO: 0 %
BUN SERPL-SCNC: 77 MG/DL (ref 9–20)
CALCIUM SPEC-MCNC: 8.6 MG/DL (ref 8.4–10.2)
CHLORIDE SERPL-SCNC: 100 MMOL/L (ref 98–107)
CHOLEST SERPL-MCNC: 148 MG/DL (ref ?–200)
CK SERPL-CCNC: 34 U/L (ref 55–170)
CO2 SERPL-SCNC: 25 MMOL/L (ref 22–30)
EOSINOPHIL # BLD AUTO: 0 K/UL (ref 0–0.7)
EOSINOPHIL NFR BLD AUTO: 0 %
ERYTHROCYTE [DISTWIDTH] IN BLOOD BY AUTOMATED COUNT: 3.98 M/UL (ref 4.3–5.9)
ERYTHROCYTE [DISTWIDTH] IN BLOOD: 13.1 % (ref 11.5–15.5)
GLUCOSE SERPL-MCNC: 147 MG/DL (ref 74–99)
HBA1C MFR BLD: 7.4 % (ref 4–6)
HCT VFR BLD AUTO: 39.5 % (ref 39–53)
HDLC SERPL-MCNC: 36 MG/DL (ref 40–60)
HGB BLD-MCNC: 12.2 GM/DL (ref 13–17.5)
LDLC SERPL CALC-MCNC: 64 MG/DL (ref 0–99)
LYMPHOCYTES # SPEC AUTO: 0.5 K/UL (ref 1–4.8)
LYMPHOCYTES NFR SPEC AUTO: 5 %
MCH RBC QN AUTO: 30.6 PG (ref 25–35)
MCHC RBC AUTO-ENTMCNC: 30.9 G/DL (ref 31–37)
MCV RBC AUTO: 99 FL (ref 80–100)
MONOCYTES # BLD AUTO: 0.3 K/UL (ref 0–1)
MONOCYTES NFR BLD AUTO: 3 %
NEUTROPHILS # BLD AUTO: 8.6 K/UL (ref 1.3–7.7)
NEUTROPHILS NFR BLD AUTO: 91 %
PLATELET # BLD AUTO: 111 K/UL (ref 150–450)
POTASSIUM SERPL-SCNC: 4.6 MMOL/L (ref 3.5–5.1)
PROT SERPL-MCNC: 5.9 G/DL (ref 6.3–8.2)
SODIUM SERPL-SCNC: 136 MMOL/L (ref 137–145)
TRIGL SERPL-MCNC: 241 MG/DL (ref ?–150)
TROPONIN I SERPL-MCNC: 0.27 NG/ML (ref 0–0.03)
WBC # BLD AUTO: 9.5 K/UL (ref 3.8–10.6)

## 2018-02-12 RX ADMIN — TAMSULOSIN HYDROCHLORIDE SCH MG: 0.4 CAPSULE ORAL at 19:56

## 2018-02-12 RX ADMIN — ATORVASTATIN CALCIUM SCH MG: 20 TABLET, FILM COATED ORAL at 19:56

## 2018-02-12 RX ADMIN — OXYCODONE AND ACETAMINOPHEN SCH: 5; 325 TABLET ORAL at 15:58

## 2018-02-12 RX ADMIN — OXYCODONE AND ACETAMINOPHEN SCH: 5; 325 TABLET ORAL at 08:05

## 2018-02-12 RX ADMIN — CARVEDILOL SCH: 12.5 TABLET, FILM COATED ORAL at 19:56

## 2018-02-12 RX ADMIN — DEXAMETHASONE SCH MG: 4 TABLET ORAL at 14:16

## 2018-02-12 RX ADMIN — APIXABAN SCH MG: 5 TABLET, FILM COATED ORAL at 19:56

## 2018-02-12 RX ADMIN — DEXAMETHASONE SCH MG: 4 TABLET ORAL at 19:56

## 2018-02-12 RX ADMIN — DEXAMETHASONE SCH: 4 TABLET ORAL at 15:58

## 2018-02-12 RX ADMIN — CARVEDILOL SCH MG: 12.5 TABLET, FILM COATED ORAL at 08:06

## 2018-02-12 RX ADMIN — APIXABAN SCH MG: 5 TABLET, FILM COATED ORAL at 08:06

## 2018-02-12 RX ADMIN — OXYCODONE AND ACETAMINOPHEN SCH: 5; 325 TABLET ORAL at 23:23

## 2018-02-12 NOTE — ECHOF
Referral Reason:SOB



MEASUREMENTS

--------

HEIGHT: 165.1 cm

WEIGHT: 125.2 kg

BP: 

RVIDd:   1.3 cm     (< 3.3)

IVSd:   1.3 cm     (0.6 - 1.1)

LVIDd:   3.5 cm     (3.9 - 5.3)

LVPWd:   1.1 cm     (0.6 - 1.1)

IVSs:   1.6 cm

LVIDs:   1.7 cm

LVPWs:   1.1 cm

Ao Diam:   2.7 cm     (2.0 - 3.7)

LA Diam:   3.8 cm     (2.7 - 3.8)

MV EXCURSION:   16.425 mm     (> 18.000)

MV EF SLOPE:   30 mm/s     (70 - 150)

EPSS:   1.3 cm

MV E John:   0.88 m/s

MV DecT:   235 ms

MV A John:   0.54 m/s

MV E/A Ratio:   1.61 

RAP:   5.00 mmHg

RVSP:   18.48 mmHg







FINDINGS

--------

Sinus rhythm.

This was a techncally difficult study with suboptimal views, , Lumason utilized for enhancement of im
ages.

The left ventricular size is normal.   There is moderate concentric left ventricular hypertrophy.   O
verall left ventricular systolic function is normal with, an EF between 55 - 60 %.

The right ventricle is normal in size.

The left atrial size is normal.

The right atrial size is normal.

5.0mg OF Lumason UTLIZED: 2 OR MORE WALL SEGMENTS NOT VISUALIZED.

The aortic valve was not well visualized.

Mild mitral annular calcification present.   Mild mitral regurgitation is present.

Mild tricuspid regurgitation present.   There is no evidence of pulmonary hypertension.   The right v
entricular systolic pressure, as measured by Doppler, is 18.48mmHg.

The pulmonic valve was not well visualized.

The aortic root size is normal.

Echo free space represents a pericardial fat pad.



CONCLUSIONS

--------

1. Sinus rhythm.

2. This was a techncally difficult study with suboptimal views, , Lumason utilized for enhancement of
 images.

3. There is moderate concentric left ventricular hypertrophy.

4. Overall left ventricular systolic function is normal with, an EF between 55 - 60 %.

5. The left atrial size is normal.

6. 5.0mg OF Lumason UTLIZED: 2 OR MORE WALL SEGMENTS NOT VISUALIZED.

7. The aortic valve was not well visualized.

8. Mild mitral annular calcification present.

9. Mild mitral regurgitation is present.

10. Mild tricuspid regurgitation present.

11. There is no evidence of pulmonary hypertension.

12. The pulmonic valve was not well visualized.

13. The aortic root size is normal.

14. Echo free space represents a pericardial fat pad.





SONOGRAPHER: Rhona James RDCS

## 2018-02-12 NOTE — P.CRDCN
History of Present Illness


Consult date: 18


Requesting physician: Prabhakar Claire


Reason for Consult (text): 





Atrial fibrillation, abnormal troponin


Chief complaint: Progressive weakness


History of present illness: 


This is a 77-year-old  gentleman with history of hypertension, 

hyperlipidemia, viral hepatitis, prior pulmonary embolism and DVTs for which he 

has been on Eliquis approximately for 3 years or so, patient also has a lung 

cancer with possible metastases to the brain for which she is undergoing 

chemotherapy and radiation.  Patient presents to the hospital with symptoms of 

progressively worsening weakness, he states that for the past few days he has 

also been having diarrhea stools.  Patient denies any overt shortness of breath

, he does state that he had an episode of chest discomfort which seemed to be 

relieved with omeprazole.  He states that mainly because of the progressive 

weakness was the reason he came to the hospital for further evaluation.  

Initial EKG on presentation here showed a normal sinus rhythm with T-wave 

inversion in the lateral leads.  Subsequent EKG performed last evening showed 

atrial fibrillation with a moderately rapid ventricular response and ST-T wave 

changes noted in 1 and aVL.  Chest x-ray showed COPD, right-sided pleural 

reaction, likely representing fluid.  Mild cardiomegaly.  Abdominal x-ray did 

not reveal any acute intra-abdominal abnormality.  Laboratory data was reviewed

, hemoglobin 12.2, white blood cell count normal, platelet count 111, sodium 136

, potassium 4.6, BUN 70 on admission 1.2 this morning, 77 and 1.5 this morning.

  Troponins 0.26, 0.22, 0.27.  CK 38, 36, 34.  At the time of my examination 

this morning, patient denies any chest discomfort, breathing overall is stable.








Past Medical History


Past Medical History: Cancer, Hyperlipidemia, Hypertension, Pulmonary Embolus (

PE), Renal Disease


Additional Past Medical History / Comment(s): lung ca,stage 3 kidney brain ca, 

spinal stenosis, kidney stenosis, hep a and hepb.  last chemo   and 

radiation last week was scheduled, did not go, gout.


History of Any Multi-Drug Resistant Organisms: Other MDRO


Date of last positivie culture/infection: 3 months ago


MDRO Source:: HEP A


Past Surgical History: Hernia Repair


Additional Past Surgical History / Comment(s): rotator cuff


Past Psychological History: No Psychological Hx Reported


Smoking Status: Current every day smoker


Past Alcohol Use History: None Reported


Additional Past Alcohol Use History / Comment(s): Patient has one cocktail 

every night around 5pm.


Past Drug Use History: None Reported





- Past Family History


  ** Mother


Family Medical History: Cancer


Additional Family Medical History / Comment(s):  of uterine cancer





  ** Father


Family Medical History: No Reported History





Medications and Allergies


 Home Medications











 Medication  Instructions  Recorded  Confirmed  Type


 


Allopurinol [Zyloprim] 100 mg PO Q48H 18 History


 


Apixaban [Eliquis] 5 mg PO BID 18 History


 


Atorvastatin [Lipitor] 20 mg PO HS 18 History


 


Carvedilol [Coreg] 12.5 mg PO BID 18 History


 


Chlorthalidone 25 mg PO DAILY 18 History


 


Losartan Potassium [Cozaar] 100 mg PO DAILY 18 History


 


Tamsulosin [Flomax] 0.4 mg PO HS 18 History


 


amLODIPine [Norvasc] 5 mg PO HS 18 History


 


oxyCODONE-APAP 5-325MG [Percocet 1 tab PO TID 18 History





5-325 mg]    











 Allergies











Allergy/AdvReac Type Severity Reaction Status Date / Time


 


No Known Allergies Allergy   Verified 18 12:45














Physical Exam


Vitals: 


 Vital Signs











  Temp Pulse Pulse Resp BP BP Pulse Ox


 


 18 08:00  97.7 F   88  24   109/65  100


 


 18 04:00  97.6 F   87  18   114/70  100


 


 18 00:00  97.5 F L   93  18   98/68  97


 


 18 20:00  98.0 F   79  18   107/64  98


 


 18 18:15    78  16   


 


 18 17:05  98.0 F  73   16  149/67   98


 


 18 16:36  97.3 F L   78  18   130/59  100


 


 18 15:45   66   18  114/56   99


 


 18 14:46   68   18  113/54   99


 


 18 13:54   65   18  135/65   98


 


 18 13:06   70   20    99


 


 18 11:47  98.2 F  83   18  141/66   100








 Intake and Output











 18





 22:59 06:59 14:59


 


Intake Total 180  


 


Output Total  1525 300


 


Balance 180 -1525 -300


 


Intake:   


 


  Oral 180  


 


Output:   


 


  Urine  1525 300


 


Other:   


 


  Voiding Method Urinal Urinal 


 


  # Voids  1 


 


  # Bowel Movements 1  


 


  Weight 104 kg 125.3 kg 











PHYSICAL EXAMINATION: 





HEENT: Head is atraumatic, normocephalic.  Pupils equal, round.  Neck is 

supple.  There is no elevated jugular venous pressure.





HEART EXAMINATION: Heart S1 and S2 irregularly irregular systolic murmur is 

heard





CHEST EXAMINATION: Lungs reveal scattered coarse rhonchi throughout.





ABDOMEN:  Soft, nontender. Bowel sounds are heard. No organomegaly noted.


 


EXTREMITIES: 2+ peripheral pulses with no evidence of peripheral edema and no 

calf tenderness noted.





NEUROLOGIC patient is awake, alert and oriented -3.


 


.


 











Results





 18 06:08





 18 06:08


 Cardiac Enzymes











  18 Range/Units





  12:34 12:34 18:07 


 


AST  55    (17-59)  U/L


 


CK-MB (CK-2)   8.6 H*  8.7 H*  (0.0-2.4)  ng/mL


 


Troponin I   0.261 H*  0.229 H*  (0.000-0.034)  ng/mL














  18 Range/Units





  00:58 06:08 


 


AST   46  (17-59)  U/L


 


CK-MB (CK-2)  7.2 H*   (0.0-2.4)  ng/mL


 


Troponin I  0.271 H*   (0.000-0.034)  ng/mL








 Coagulation











  18 Range/Units





  12:34 


 


PT  10.3  (9.0-12.0)  sec


 


APTT  20.8 L  (22.0-30.0)  sec








 Lipids











  18 Range/Units





  06:08 


 


Triglycerides  241 H  (<150)  mg/dL


 


Cholesterol  148  (<200)  mg/dL


 


HDL Cholesterol  36 L  (40-60)  mg/dL








 CBC











  18 Range/Units





  12:34 06:08 


 


WBC  8.7  9.5  (3.8-10.6)  k/uL


 


RBC  3.89 L  3.98 L  (4.30-5.90)  m/uL


 


Hgb  12.3 L  12.2 L  (13.0-17.5)  gm/dL


 


Hct  36.7 L  39.5  (39.0-53.0)  %


 


Plt Count  107 L  111 L  (150-450)  k/uL








 Comprehensive Metabolic Panel











  18 Range/Units





  12:34 06:08 


 


Sodium  136 L  136 L  (137-145)  mmol/L


 


Potassium  4.8  4.6  (3.5-5.1)  mmol/L


 


Chloride  101  100  ()  mmol/L


 


Carbon Dioxide  22  25  (22-30)  mmol/L


 


BUN  70 H  77 H  (9-20)  mg/dL


 


Creatinine  1.23  1.58 H  (0.66-1.25)  mg/dL


 


Glucose  150 H  147 H  (74-99)  mg/dL


 


Calcium  8.4  8.6  (8.4-10.2)  mg/dL


 


AST  55  46  (17-59)  U/L


 


ALT  102 H  103 H  (21-72)  U/L


 


Alkaline Phosphatase  72  78  ()  U/L


 


Total Protein  5.6 L  5.9 L  (6.3-8.2)  g/dL


 


Albumin  3.1 L  3.2 L  (3.5-5.0)  g/dL








 Current Medications











Generic Name Dose Route Start Last Admin





  Trade Name Freq  PRN Reason Stop Dose Admin


 


Allopurinol  100 mg  18 16:15  18 18:03





  Zyloprim  PO   100 mg





  Q48H KENYA   Administration


 


Amlodipine Besylate  5 mg  18 21:00  18 20:23





  Norvasc  PO   5 mg





  HS KENYA   Administration


 


Apixaban  5 mg  18 21:00  18 08:06





  Eliquis  PO   5 mg





  BID KENYA   Administration


 


Aspirin  325 mg  18 09:00  





  Aspirin  PO   





  DAILY KENYA   


 


Atorvastatin Calcium  20 mg  18 21:00  18 20:23





  Lipitor  PO   20 mg





  HS KENYA   Administration


 


Carvedilol  12.5 mg  18 21:00  18 08:06





  Coreg  PO   12.5 mg





  BID KENYA   Administration


 


Chlorthalidone  25 mg  18 09:00  





  Hygroton  PO   





  DAILY [facility]   


 


Sodium Chloride  1,000 mls @ 20 mls/hr  18 16:15  18 17:56





  Saline 0.9%  IV   Not Given





  .Q24H KENYA   


 


Losartan Potassium  100 mg  18 09:00  





  Cozaar  PO   





  DAILY KENYA   


 


Nitroglycerin  0.4 mg  18 16:07  





  Nitrostat  SUBLINGUAL   





  Q5M PRN   





  Chest Pain   


 


Oxycodone/Acetaminophen  1 each  18 22:00  18 08:05





  Percocet 5-325  PO   Not Given





  TID KENYA   


 


Tamsulosin HCl  0.4 mg  18 21:00  18 20:23





  Flomax  PO   0.4 mg





  HS KENYA   Administration








 Intake and Output











 18





 22:59 06:59 14:59


 


Intake Total 180  


 


Output Total  1525 300


 


Balance 180 -1525 -300


 


Intake:   


 


  Oral 180  


 


Output:   


 


  Urine  1525 300


 


Other:   


 


  Voiding Method Urinal Urinal 


 


  # Voids  1 


 


  # Bowel Movements 1  


 


  Weight 104 kg 125.3 kg 








 





 18 06:08 





 18 06:08 











EKG Interpretations (text)





Initial EKG shows a normal sinus rhythm with nonspecific ST-T wave changes in 

the lateral leads.  Subsequent EKG shows atrial fibrillation with a moderately 

rapid ventricular response.





Assessment and Plan


Plan: 


Assessment and plan


#1 symptoms of diarrhea with associated progressive worsening weakness.


#2 chest pain approximately one week ago, relieved with omeprazole.  Troponin 

0.2, 0.2, 0.2.  Initial EKG shows normal sinus rhythm with nonspecific ST-T 

wave changes in the lateral leads.  Subsequent EKG shows atrial fibrillation 

with a moderately rapid ventricular response


#3 atrial fibrillation, paroxysmal, appears to be new for the patient


#4 history of DVT and PE for which the patient is on Eliquis


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


#6 hypertension


#7 hyperlipidemia


#8 history of viral hepatitis


#9 acute renal failure, likely secondary to dehydration from diarrhea


#10 nicotine dependence





Plan


Patient's troponin abnormality not consistent with acute coronary syndrome with 

no significant rise and fall pattern.  Could be secondary to oxygen supply and 

demand mismatch.  We will obtain an echocardiogram with Doppler study.  Patient'

s atrial fibrillation appears to be new, however he is on anticoagulation with 

Eliquis because of prior PE and DVT.  We will crease a dose of aspirin to 81 mg 

daily, continue Norvasc, Coreg, Hygroton, losartan.  Patient was also given a 

dose of IV Lasix in the emergency room which is now on hold.  Further 

recommendations to follow.





DNP note has been reviewed, I agree with a documented findings and plan of 

care.  Patient was seen and examined.

## 2018-02-12 NOTE — PN
PROGRESS NOTE



DATE OF SERVICE:

02/12/18



DATA:

He is 5 feet 6 inches, weight 125.3 kg.  BSA 2.29 m2. BMI 44.6 kg/m2.  Allergy is

unknown.



Mr. Cedrick Zuluaga is a 77-year-old white male admitted to the hospital through the

emergency room and at that time he was seen by the ER physician and staff and

subsequently admitted to the hospital with history and physical by Dr. Ulisses Laughlin.

At the time of the initial admission he was brought to the emergency room because of

the shortness of breath and generalized weakness and 1 week prior to the presentation,

he had diarrhea without nausea or vomiting and had exertional dyspnea and no energy.

At the time he has no hematuria or chest pain.  No rhinorrhea.  No cough or ear pain.

The patient had underlying past history of right upper lung adenocarcinoma and was

treated with chemotherapy by Dr. Hudson and subsequently they found that he had a

metastasis of the brain with the underlying CT scan.  Dr. Hudson sent him to Radiation

Oncology Dr. Casey Walden and he was planning for cyber knife for radiation of the brain

next week.  However, he started him to decrease the edema of the brain on steroid

dexamethasone which is 6 mg 3 times a day and until he sees him again for evaluation.

Subsequently, the admission found that he had non ST-segment elevation with myocardial

infarction, non STEMI myocardial infarction.  They diagnosed him with congestive heart

failure and generalized weakness.  The patient admitted with the history and physical

done by Dr. Laughlin and at that time, his impression that the patient had elevation of

troponin which progressively increased.  Also, he had GERD disease.  He had acute renal

failure with the elevation of the BUN and creatinine possibility of prerenal azotemia

with the use of Hygroton and the diuresis therapy.  The patient was lung cancer and

metastasis to the brain.  Patient on Eliquis and hyperlipidemia and he had history of

viral hepatitis and lumbar stenosis in the past.  They ordered the echocardiogram and

the echocardiogram done indicating that his ejection fraction is 55-60 and mild mitral

regurgitation and tricuspid regurgitation.  The patient was seen by the Cardiology

team,  with signed by Dr. Ann Cruz who is a nurse practitioner and stated that he

has symptoms of diarrhea associated with worsening, he had chest pain 1 week ago,

relieved by omeprazole.  He had abnormal EKG, however further monitoring showed that he

had atrial fibrillation with the rapid ventricular response with atrial fibrillation

paroxysmal.  He has a history of DVT and PE and he is on Eliquis.  He has lung cancer

with metastasis to the brain, hypertension, viral hepatitis and acute renal failure

secondary to dehydration and diarrhea and nicotine dependence.



He had hypotension and we stopped the many of the medication to go help improve his

blood pressure.



With physical examination, his niece was present in the room and as she is a caregiver

and she told me that he has been drinking only at night and he drinks whiskey and he

drinks a bottle every night and maybe _____ last him 1 week.  Also the patient is from

the dexamethasone become moon face.



His vital signs today:  Temperature 98.1, pulse 81, respiratory rate 16, blood pressure

was earlier 100/56 and now 106/63 with saturation 98.



I did stop all IV fluid with the laboratory indicating diastolic congestive heart

failure and as well as consultation with Dr. Hudson and consultation, Dr. Hudson is the

Hematology/Oncology and Dr. Iram Peña is the Radiology/Oncology in [facility].



His HEENT was negative.  Able to eat and sitting in the bed.  His lung much

improvement.  He has history of pleural effusion and pleural effusion is right-sided

and that could be associated with malignancy on the right upper lobe.  He has COPD and

mild cardiomegaly.  The patient able to take a deep breath bilaterally with increased

anteroposterior diameter.  Heart was at the time of examination irregular

irregularities and paroxysmal atrial fibrillation.  The abdomen is obese and positive

bowel sounds.  However, questionable liver enlargement and with the underlying history

of brain metastasis, possibility of the liver metastasis as well and we will obtain

ultrasound of the abdomen.  Extremities:  Trace edema bilateral.  Positive pulses and

neurologically stable.  He was dizzy and confused intermittently and found that he had

metastasis  to the brain.



IMPRESSION:

1. Non Q wave myocardial infarction/non ST-segment myocardial infarction  with

    elevation of the cardiac enzyme.

2. Chronic obstructive pulmonary disease.

3. Right upper lung mass with the cancer and metastasis to the brain.

4. Mild cardiomegaly with the underlying history of intermittent atrial fibrillation.

5. Diastolic congestive heart failure with elevated BNP, beta natriuretic peptide.

6. Underlying history of his excessive alcohol use.

At that time we will check also the abdomen with the abdominal distention and bloating

and questionable ascites.  The otherwise the plan I consulted Dr. Hudson, hematology

oncology,  consult, Dr. Walden, the Radiation/Oncology, consult the cardiology as well

as for which we already consulted them for continuation of the evaluation with the MI

and the treatment.  We will obtain the laboratory tomorrow and including liver function

tests as well as ultrasound of the abdomen.





MMODL / IJN: [id number] / Job#: [id number]

## 2018-02-13 LAB
ALBUMIN SERPL-MCNC: 2.9 G/DL (ref 3.5–5)
ALP SERPL-CCNC: 65 U/L (ref 38–126)
ALT SERPL-CCNC: 85 U/L (ref 21–72)
ANION GAP SERPL CALC-SCNC: 10 MMOL/L
AST SERPL-CCNC: 36 U/L (ref 17–59)
BASOPHILS # BLD AUTO: 0 K/UL (ref 0–0.2)
BASOPHILS NFR BLD AUTO: 0 %
BILIRUB INDIRECT SERPL-MCNC: 0.2 MG/DL (ref 0–1.1)
BILIRUBIN DIRECT+TOT PNL SERPL-MCNC: 0.3 MG/DL (ref 0–0.2)
BUN SERPL-SCNC: 64 MG/DL (ref 9–20)
CALCIUM SPEC-MCNC: 8.1 MG/DL (ref 8.4–10.2)
CHLORIDE SERPL-SCNC: 100 MMOL/L (ref 98–107)
CO2 SERPL-SCNC: 26 MMOL/L (ref 22–30)
EOSINOPHIL # BLD AUTO: 0 K/UL (ref 0–0.7)
EOSINOPHIL NFR BLD AUTO: 0 %
ERYTHROCYTE [DISTWIDTH] IN BLOOD BY AUTOMATED COUNT: 3.84 M/UL (ref 4.3–5.9)
ERYTHROCYTE [DISTWIDTH] IN BLOOD: 12.8 % (ref 11.5–15.5)
GGT SERPL-CCNC: 63 U/L (ref 15–73)
GLUCOSE BLD-MCNC: 146 MG/DL (ref 75–99)
GLUCOSE BLD-MCNC: 263 MG/DL (ref 75–99)
GLUCOSE SERPL-MCNC: 175 MG/DL (ref 74–99)
HCT VFR BLD AUTO: 37 % (ref 39–53)
HGB BLD-MCNC: 11.6 GM/DL (ref 13–17.5)
LYMPHOCYTES # SPEC AUTO: 0.3 K/UL (ref 1–4.8)
LYMPHOCYTES NFR SPEC AUTO: 5 %
MCH RBC QN AUTO: 30.2 PG (ref 25–35)
MCHC RBC AUTO-ENTMCNC: 31.4 G/DL (ref 31–37)
MCV RBC AUTO: 96.3 FL (ref 80–100)
MONOCYTES # BLD AUTO: 0.1 K/UL (ref 0–1)
MONOCYTES NFR BLD AUTO: 2 %
NEUTROPHILS # BLD AUTO: 5.2 K/UL (ref 1.3–7.7)
NEUTROPHILS NFR BLD AUTO: 93 %
PLATELET # BLD AUTO: 98 K/UL (ref 150–450)
POTASSIUM SERPL-SCNC: 4.3 MMOL/L (ref 3.5–5.1)
PROT SERPL-MCNC: 5.3 G/DL (ref 6.3–8.2)
SODIUM SERPL-SCNC: 136 MMOL/L (ref 137–145)
WBC # BLD AUTO: 5.7 K/UL (ref 3.8–10.6)

## 2018-02-13 RX ADMIN — APIXABAN SCH MG: 5 TABLET, FILM COATED ORAL at 19:49

## 2018-02-13 RX ADMIN — LOSARTAN POTASSIUM SCH MG: 50 TABLET, FILM COATED ORAL at 11:53

## 2018-02-13 RX ADMIN — INSULIN ASPART SCH UNIT: 100 INJECTION, SOLUTION INTRAVENOUS; SUBCUTANEOUS at 17:13

## 2018-02-13 RX ADMIN — OXYCODONE AND ACETAMINOPHEN SCH: 5; 325 TABLET ORAL at 19:50

## 2018-02-13 RX ADMIN — DEXAMETHASONE SCH MG: 4 TABLET ORAL at 16:18

## 2018-02-13 RX ADMIN — OXYCODONE AND ACETAMINOPHEN SCH: 5; 325 TABLET ORAL at 16:18

## 2018-02-13 RX ADMIN — INSULIN ASPART SCH UNIT: 100 INJECTION, SOLUTION INTRAVENOUS; SUBCUTANEOUS at 21:19

## 2018-02-13 RX ADMIN — DEXAMETHASONE SCH MG: 4 TABLET ORAL at 19:49

## 2018-02-13 RX ADMIN — ALLOPURINOL SCH MG: 100 TABLET ORAL at 16:21

## 2018-02-13 RX ADMIN — TAMSULOSIN HYDROCHLORIDE SCH MG: 0.4 CAPSULE ORAL at 19:49

## 2018-02-13 RX ADMIN — CARVEDILOL SCH MG: 12.5 TABLET, FILM COATED ORAL at 10:15

## 2018-02-13 RX ADMIN — APIXABAN SCH MG: 5 TABLET, FILM COATED ORAL at 10:15

## 2018-02-13 RX ADMIN — OXYCODONE AND ACETAMINOPHEN SCH EACH: 5; 325 TABLET ORAL at 10:14

## 2018-02-13 RX ADMIN — ASPIRIN 81 MG CHEWABLE TABLET SCH MG: 81 TABLET CHEWABLE at 10:15

## 2018-02-13 RX ADMIN — ATORVASTATIN CALCIUM SCH MG: 20 TABLET, FILM COATED ORAL at 19:49

## 2018-02-13 RX ADMIN — CARVEDILOL SCH MG: 12.5 TABLET, FILM COATED ORAL at 19:49

## 2018-02-13 RX ADMIN — DEXAMETHASONE SCH MG: 4 TABLET ORAL at 10:14

## 2018-02-13 NOTE — P.CONS
History of Present Illness





- Reason for Consult


Consult date: 18


NSCLC


Requesting physician: Prabhakar Claire





- Chief Complaint


weakness





- History of Present Illness





Mr. Zuluaga is a pleasant  male, who initially presented in [place] 

with the a pleural effusion 10/15, path positive for adenocarcinoma consistent 

with lung primary, biomarker testing for EGFR, ROS 1, ALK 1 and PD-L1 were all 

negative, staging PET/CT 10/30/15 showed multiple hypermetabolic pleural-based 

masses and pleural effusion, MRI of the brain 11/15 was negative. He was 

started on chemotherapy with carboplatin and Alimta in 12/15, then maintenance 

Alimta and continued on that still 10/16, treatment f/u CT CAP in  showed 

progression of the disease with enlarging hilar mass at 6.6 cm.  Then started 

on Tecentriq in , treatement f/u CT scans from  showed essentially 

stable disease with some residual right lower lobe consolidation and no 

recurrence of pleural fluid. The patient had a fall in , MRI brain and 

spine showed no evidence of any metastatic disease, some severe degenerative 

disease was noted, he had a prolonged ECF stay,more than 8 months.  He then 

moved to the [facility] in early  when he was seen by Dr. Hudson to 

establish care and continued treatment.   he had severe diarrhea and 

diagnosed with a parasitic infection, prolonged treatment with Flagyl, he had 

emboli around the time of his diagnosis and maintained on Eliquis


Follow up CT scans done in  reported no progression.  Tecentriq was last 

given 2017.  Pt was found to have brain mets in 2018, he just completed 

10 WBRT last week.  He was going to resume tecentric in the near future.  





Mr. Zuluaga presents to [facility] ED after progressive weakness and shortness of 

breath, worse with exertion. He was recently on dexamethasone for symptom 

control, without relief. During ED assessment he was found to have elevated 

troponins and EKG changes (without older EKG comparision), he subsequently was 

admitted for further cardiac monitoring for probable non-stemi myocardial 

infarction.  He is feeling ok today, appetite ok, he can ambulate short 

distances with mild to moderate SOB, he is not in any pain.  





Review of Systems





A 10 point review of systems was assessed and completed and all negative except 

HPI





Past Medical History


Past Medical History: Cancer, Hyperlipidemia, Hypertension, Pulmonary Embolus (

PE), Renal Disease


Additional Past Medical History / Comment(s): lung ca,stage 3 kidney brain ca, 

spinal stenosis, kidney stenosis, hep a and hepb.  last chemo   and 

radiation last week was scheduled, did not go, gout.


History of Any Multi-Drug Resistant Organisms: None Reported


Year Discovered:: None


MDRO Source:: None


Past Surgical History: Hernia Repair


Additional Past Surgical History / Comment(s): rotator cuff


Past Psychological History: No Psychological Hx Reported


Smoking Status: Current every day smoker


Past Alcohol Use History: None Reported


Additional Past Alcohol Use History / Comment(s): Patient has one cocktail 

every night around 5pm.


Past Drug Use History: None Reported





- Past Family History


  ** Mother


Family Medical History: Cancer


Additional Family Medical History / Comment(s):  of uterine cancer





  ** Father


Family Medical History: No Reported History





Medications and Allergies


 Home Medications











 Medication  Instructions  Recorded  Confirmed  Type


 


Allopurinol [Zyloprim] 100 mg PO Q48H 18 History


 


Apixaban [Eliquis] 5 mg PO BID 18 History


 


Atorvastatin [Lipitor] 20 mg PO HS 18 History


 


Carvedilol [Coreg] 12.5 mg PO BID 18 History


 


Chlorthalidone 25 mg PO DAILY 18 History


 


Losartan Potassium [Cozaar] 100 mg PO DAILY 18 History


 


Tamsulosin [Flomax] 0.4 mg PO HS 18 History


 


amLODIPine [Norvasc] 5 mg PO HS 18 History


 


oxyCODONE-APAP 5-325MG [Percocet 1 tab PO TID 18 History





5-325 mg]    











 Allergies











Allergy/AdvReac Type Severity Reaction Status Date / Time


 


No Known Allergies Allergy   Verified 18 12:45














Physical Exam


Vitals: 


 Vital Signs











  Temp Pulse Resp BP Pulse Ox


 


 18 15:49  97.3 F L  86  16  108/60  100


 


 18 11:41  98.3 F  81  16  133/70  99


 


 02/13/18 09:36  97.8 F  109 H  20  132/86  100


 


 18 04:00  97.6 F  74  18  124/71  99


 


 18 23:57   84  18  


 


 18 23:54  98.6 F  84  18  108/59  98


 


 18 20:00  98.9 F  81  19  103/56  97


 


 18 16:00  98.1 F  81  16  106/63  98








 Intake and Output











 18





 06:59 14:59 22:59


 


Intake Total  350 


 


Output Total 300 325 200


 


Balance -300 25 -200


 


Intake:   


 


  Oral  350 


 


Output:   


 


  Urine 300 325 200


 


Other:   


 


  Voiding Method Urinal Urinal 


 


  # Bowel Movements  1 


 


  Weight 102.1 kg  














- Constitutional


General appearance: cooperative, mild distress, morbidly obese





- EENT


Eyes: normal appearance


ENT: normal oropharynx





- Neck


Neck: no lymphadenopathy





- Respiratory


Respiratory: right: diminished (Lower lobes)





- Cardiovascular


Rhythm: irregularly irregular


  ** leg


Peripheral Edema: bilateral: Trace





- Gastrointestinal


General gastrointestinal: no absent bowel sounds, no decreased bowel sounds, no 

distended, no hepatomegaly, no hyperactive bowel sounds, normal bowel sounds, 

no organomegaly, no rigid, no scaphoid, soft, no splenomegaly, no tenderness, 

no umbilical hernia, no ventral hernia





- Neurologic





non-focal





- Musculoskeletal





Strength appears wnl, standing with standby assistance





- Psychiatric


Psychiatric: A&O x's 3, appropriate affect, intact judgment & insight





Results


CBC & Chem 7: 


 18 06:00





 18 06:00


Labs: 


 Abnormal Lab Results - Last 24 Hours (Table)











  18 Range/Units





  06:08 06:08 06:00 


 


RBC     (4.30-5.90)  m/uL


 


Hgb     (13.0-17.5)  gm/dL


 


Hct     (39.0-53.0)  %


 


Plt Count     (150-450)  k/uL


 


Lymphocytes #     (1.0-4.8)  k/uL


 


Sodium     (137-145)  mmol/L


 


BUN     (9-20)  mg/dL


 


Creatinine     (0.66-1.25)  mg/dL


 


Glucose     (74-99)  mg/dL


 


Hemoglobin A1c  7.4 H    (4.0-6.0)  %


 


Calcium     (8.4-10.2)  mg/dL


 


Delta Bilirubin     (0.0-0.2)  mg/dL


 


ALT     (21-72)  U/L


 


Total Protein     (6.3-8.2)  g/dL


 


Albumin     (3.5-5.0)  g/dL


 


PTH Intact   114.2 H   (14.0-72.0)  pg/mL


 


Hepatitis A IgM Ab    Reactive H  (Non-Reactive)  














  18 Range/Units





  06:00 06:00 


 


RBC  3.84 L   (4.30-5.90)  m/uL


 


Hgb  11.6 L   (13.0-17.5)  gm/dL


 


Hct  37.0 L   (39.0-53.0)  %


 


Plt Count  98 L   (150-450)  k/uL


 


Lymphocytes #  0.3 L   (1.0-4.8)  k/uL


 


Sodium   136 L  (137-145)  mmol/L


 


BUN   64 H  (9-20)  mg/dL


 


Creatinine   1.31 H  (0.66-1.25)  mg/dL


 


Glucose   175 H  (74-99)  mg/dL


 


Hemoglobin A1c    (4.0-6.0)  %


 


Calcium   8.1 L  (8.4-10.2)  mg/dL


 


Delta Bilirubin   0.3 H  (0.0-0.2)  mg/dL


 


ALT   85 H  (21-72)  U/L


 


Total Protein   5.3 L  (6.3-8.2)  g/dL


 


Albumin   2.9 L  (3.5-5.0)  g/dL


 


PTH Intact    (14.0-72.0)  pg/mL


 


Hepatitis A IgM Ab    (Non-Reactive)  











Chest x-ray: report reviewed


Abdominal x-ray: report reviewed





Assessment and Plan


(1) Lung cancer metastatic to brain


Narrative/Plan: 


Status Post WBRT, Rad/Onc to Follow. 


Recently treated with Tecentriq last 17 (Long acting medication). 


Chemotherapy currently on hold until after patient is discharged and stabilized


Follow-up with Dr. Hudson as outpatient


Current Visit: Yes   Status: Acute   Code(s): C34.90 - MALIGNANT NEOPLASM OF 

UNSP PART OF UNSP BRONCHUS OR LUNG; C79.31 - SECONDARY MALIGNANT NEOPLASM OF 

BRAIN   SNOMED Code(s): 25201363


   





(2) Congestive heart failure


Narrative/Plan: 


Per Cardiology


Current Visit: Yes   Status: Acute   Code(s): I50.9 - HEART FAILURE, 

UNSPECIFIED   SNOMED Code(s): 78297486


   





(3) Generalized weakness


Narrative/Plan: 


PT/OT eval and treat


Current Visit: Yes   Status: Acute   Code(s): R53.1 - WEAKNESS   SNOMED Code(s)

: 23876175


   





(4) Normocytic anemia


Narrative/Plan: 


Multifactoral secondary to chemotherapy and current malignancy, monitor CBC, no 

current intervention needed


Current Visit: Yes   Status: Acute   Code(s): D64.9 - ANEMIA, UNSPECIFIED   

SNOMED Code(s): 848057237


   





(5) Thrombocytopenia


Narrative/Plan: 


Stable, secondary to recent cancer therapy, radiation and chemotherapy, monitor 

CBC, no acute intervention needed


Current Visit: Yes   Status: Acute   Code(s): D69.6 - THROMBOCYTOPENIA, 

UNSPECIFIED   SNOMED Code(s): 543233633


   


Plan: 





Attesation:





I Performed a history and examination of this patient, discussed with dictator 

and agree with dictators note. Documented as a scribe.

## 2018-02-13 NOTE — P.CONS
History of Present Illness





- Reason for Consult


Consult date: 18


Active lung cancer with brain metastasis


Requesting physician: Aime Hudson





- Chief Complaint


I feel weak everywhere





- History of Present Illness


Cedrick Zuluaga is a 77 year old Male who presented while living in [place] 

with shortness of breathe. Imaging identified a right sided pleural effusion 

which was tapped and consistent with adenocarcinoma of the lung. PET/CT on 10/30

/15 visualized hypermetabolic pleural based masses. MRI of the brain in 

2015 was within normal limits. 





Cedrick was treated with carboplatin/alimta followed by maintenance alimta. At 

progression he was initiated on Tencentriq in 2016. He continues on 

Tencentriq under the care of Dr. Hudson after relocating to the [facility] in 

2017 to be closer to family.





Cedrick underwent MRI of the brain on 18 secondary to dizziness. A large 

area of vasogenic edema involving the left temporal lobe with a 1.8cm mass was 

seen. Thin cut MRI on 18 did identify a second 0.8cm focus in the right 

frontal lobe.





Cedrick began 3 fraction radiosurgery on 18. Unfortunately secondary to 

scheduling conflicts with his family he has been unable to return for his last 

2 fractions of SRS. 





Mr. Zuluaga presents to [facility] ED after progressive weakness and shortness of 

breath, worse with exertion. During ED assessment he was found to have elevated 

troponins and EKG changes. H4 admitted for further cardiac monitoring for 

probable non-stemi myocardial infarction. He is doing well today with 

complaints of generalized fatigue. His speech is slowed, but mentation intact. 








Past Medical History


Past Medical History: Cancer, Hyperlipidemia, Hypertension, Pulmonary Embolus (

PE), Renal Disease


Additional Past Medical History / Comment(s): lung ca,stage 3 kidney brain ca, 

spinal stenosis, kidney stenosis, hep a and hepb.  last chemo   and 

radiation last week was scheduled, did not go, gout.


History of Any Multi-Drug Resistant Organisms: None Reported


Year Discovered:: None


MDRO Source:: None


Past Surgical History: Hernia Repair


Additional Past Surgical History / Comment(s): rotator cuff


Past Psychological History: No Psychological Hx Reported


Smoking Status: Current every day smoker


Past Alcohol Use History: None Reported


Additional Past Alcohol Use History / Comment(s): Patient has one cocktail 

every night around 5pm.


Past Drug Use History: None Reported





- Past Family History


  ** Mother


Family Medical History: Cancer


Additional Family Medical History / Comment(s):  of uterine cancer





  ** Father


Family Medical History: No Reported History





Medications and Allergies


 Home Medications











 Medication  Instructions  Recorded  Confirmed  Type


 


Allopurinol [Zyloprim] 100 mg PO Q48H 18 History


 


Apixaban [Eliquis] 5 mg PO BID 18 History


 


Atorvastatin [Lipitor] 20 mg PO HS 18 History


 


Carvedilol [Coreg] 12.5 mg PO BID 18 History


 


Chlorthalidone 25 mg PO DAILY 18 History


 


Losartan Potassium [Cozaar] 100 mg PO DAILY 18 History


 


Tamsulosin [Flomax] 0.4 mg PO HS 18 History


 


amLODIPine [Norvasc] 5 mg PO HS 18 History


 


oxyCODONE-APAP 5-325MG [Percocet 1 tab PO TID 18 History





5-325 mg]    











 Allergies











Allergy/AdvReac Type Severity Reaction Status Date / Time


 


No Known Allergies Allergy   Verified 18 12:45














Physical Exam


Vitals: 


 Vital Signs











  Temp Pulse Resp BP Pulse Ox


 


 18 16:00    16  


 


 18 15:49  97.3 F L  86  16  108/60  100


 


 18 11:41  98.3 F  81  16  133/70  99


 


 18 09:36  97.8 F  109 H  20  132/86  100


 


 18 04:00  97.6 F  74  18  124/71  99


 


 18 23:57   84  18  


 


 18 23:54  98.6 F  84  18  108/59  98


 


 18 20:00  98.9 F  81  19  103/56  97








 Intake and Output











 18





 06:59 14:59 22:59


 


Intake Total  350 


 


Output Total 300 325 200


 


Balance -300 25 -200


 


Intake:   


 


  Oral  350 


 


Output:   


 


  Urine 300 325 200


 


Other:   


 


  Voiding Method Urinal Urinal Urinal


 


  # Bowel Movements  1 


 


  Weight 102.1 kg  














- Constitutional


General appearance: obese





- EENT


Eyes: EOMI





- Neck


Neck: no lymphadenopathy





- Cardiovascular


Rhythm: regular





- Neurologic


Neurologic: CNII-XII intact





- Musculoskeletal


Musculoskeletal: generalized weakness





- Psychiatric


Psychiatric: A&O x's 3





Results


CBC & Chem 7: 


 18 06:00





 18 06:00


Labs: 


 Abnormal Lab Results - Last 24 Hours (Table)











  18 Range/Units





  06:08 06:08 06:00 


 


RBC     (4.30-5.90)  m/uL


 


Hgb     (13.0-17.5)  gm/dL


 


Hct     (39.0-53.0)  %


 


Plt Count     (150-450)  k/uL


 


Lymphocytes #     (1.0-4.8)  k/uL


 


Sodium     (137-145)  mmol/L


 


BUN     (9-20)  mg/dL


 


Creatinine     (0.66-1.25)  mg/dL


 


Glucose     (74-99)  mg/dL


 


POC Glucose (mg/dL)     (75-99)  mg/dL


 


Hemoglobin A1c  7.4 H    (4.0-6.0)  %


 


Calcium     (8.4-10.2)  mg/dL


 


Delta Bilirubin     (0.0-0.2)  mg/dL


 


ALT     (21-72)  U/L


 


Total Protein     (6.3-8.2)  g/dL


 


Albumin     (3.5-5.0)  g/dL


 


PTH Intact   114.2 H   (14.0-72.0)  pg/mL


 


Hepatitis A IgM Ab    Reactive H  (Non-Reactive)  














  18 Range/Units





  06:00 06:00 16:25 


 


RBC  3.84 L    (4.30-5.90)  m/uL


 


Hgb  11.6 L    (13.0-17.5)  gm/dL


 


Hct  37.0 L    (39.0-53.0)  %


 


Plt Count  98 L    (150-450)  k/uL


 


Lymphocytes #  0.3 L    (1.0-4.8)  k/uL


 


Sodium   136 L   (137-145)  mmol/L


 


BUN   64 H   (9-20)  mg/dL


 


Creatinine   1.31 H   (0.66-1.25)  mg/dL


 


Glucose   175 H   (74-99)  mg/dL


 


POC Glucose (mg/dL)    263 H  (75-99)  mg/dL


 


Hemoglobin A1c     (4.0-6.0)  %


 


Calcium   8.1 L   (8.4-10.2)  mg/dL


 


Delta Bilirubin   0.3 H   (0.0-0.2)  mg/dL


 


ALT   85 H   (21-72)  U/L


 


Total Protein   5.3 L   (6.3-8.2)  g/dL


 


Albumin   2.9 L   (3.5-5.0)  g/dL


 


PTH Intact     (14.0-72.0)  pg/mL


 


Hepatitis A IgM Ab     (Non-Reactive)  














Assessment and Plan


Assessment: 





77 year old male with metastastic lung cancer well controlled on tencentriq 

with recent diagnosis of 2 brain metastasis. Patient has been noncomplaint with 

planned 3 fraction radiosurgery secondary to transportation issues. 


Plan: 


Lung cancer with brain metastasis


- Plans for completing the final 2 fractions of radiosurgery after treatment.


- Dexamethasone 6mg TID changed to 2mg TID


- Continue follow up with Dr. Hudson/Bettie Riley for Tencentriq





Generalized weakness


- Physical therapy eval





non-stemi myocardial infarction


- Cardiology recs. 





Time with Patient: Greater than 30

## 2018-02-13 NOTE — P.PN
Subjective


Progress Note Date: 02/13/18





This is a 77-year-old  gentleman with history of hypertension, 

hyperlipidemia, viral hepatitis, prior pulmonary embolism and DVTs for which he 

has been on Eliquis approximately for 3 years or so, patient also has a lung 

cancer with possible metastases to the brain for which she is undergoing 

chemotherapy and radiation.  Patient presents to the hospital with symptoms of 

progressively worsening weakness, he states that for the past few days he has 

also been having diarrhea stools.  Patient denies any overt shortness of breath

, he does state that he had an episode of chest discomfort which seemed to be 

relieved with omeprazole.  He states that mainly because of the progressive 

weakness was the reason he came to the hospital for further evaluation.  

Initial EKG on presentation here showed a normal sinus rhythm with T-wave 

inversion in the lateral leads.  Subsequent EKG performed last evening showed 

atrial fibrillation with a moderately rapid ventricular response and ST-T wave 

changes noted in 1 and aVL.  Chest x-ray showed COPD, right-sided pleural 

reaction, likely representing fluid.  Mild cardiomegaly.  Abdominal x-ray did 

not reveal any acute intra-abdominal abnormality.  Laboratory data was reviewed

, hemoglobin 12.2, white blood cell count normal, platelet count 111, sodium 136

, potassium 4.6, BUN 70 on admission 1.2 this morning, 77 and 1.5 this morning.

  Troponins 0.26, 0.22, 0.27.  CK 38, 36, 34.  At the time of my examination 

this morning, patient denies any chest discomfort, breathing overall is stable.








02/13/2018


Patient was seen and examined this morning, sitting up at the bedside, no 

complaints today overall.  He did have a niece at the bedside, we explained to 

her that the troponin abnormality was not consistent with a myocardial 

infarction.  He also had an echocardiogram with Doppler study performed which 

revealed a normal left ventricular systolic function.  From cardiology's 

perspective, we'll follow this patient with you now on an as-needed basis only, 

please don't hesitate to call with any questions.








Objective





- Vital Signs


Vital signs: 


 Vital Signs











Temp  98.3 F   02/13/18 11:41


 


Pulse  81   02/13/18 11:41


 


Resp  16   02/13/18 11:41


 


BP  133/70   02/13/18 11:41


 


Pulse Ox  99   02/13/18 11:41








 Intake & Output











 02/12/18 02/13/18 02/13/18





 18:59 06:59 18:59


 


Intake Total 358  350


 


Output Total 650 500 325


 


Balance -292 -500 25


 


Weight 125.3 kg 102.1 kg 


 


Intake:   


 


  Oral 358  350


 


Output:   


 


  Urine 650 500 325


 


Other:   


 


  Voiding Method Urinal Urinal Urinal


 


  # Bowel Movements   1














- Exam





PHYSICAL EXAMINATION: 





HEENT: Head is atraumatic, normocephalic.  Pupils equal, round.  Neck is 

supple.  There is no elevated jugular venous pressure.





HEART EXAMINATION: Heart S1 and S2 irregularly irregular systolic murmur is 

heard





CHEST EXAMINATION: Lungs reveal scattered coarse rhonchi throughout.





ABDOMEN:  Soft, nontender. Bowel sounds are heard. No organomegaly noted.


 


EXTREMITIES: 2+ peripheral pulses with no evidence of peripheral edema and no 

calf tenderness noted.





NEUROLOGIC patient is awake, alert and oriented -3.


 





- Labs


CBC & Chem 7: 


 02/13/18 06:00





 02/13/18 06:00


Labs: 


 Abnormal Lab Results - Last 24 Hours (Table)











  02/12/18 02/12/18 02/13/18 Range/Units





  06:08 06:08 06:00 


 


RBC    3.84 L  (4.30-5.90)  m/uL


 


Hgb    11.6 L  (13.0-17.5)  gm/dL


 


Hct    37.0 L  (39.0-53.0)  %


 


Plt Count    98 L  (150-450)  k/uL


 


Lymphocytes #    0.3 L  (1.0-4.8)  k/uL


 


Sodium     (137-145)  mmol/L


 


BUN     (9-20)  mg/dL


 


Creatinine     (0.66-1.25)  mg/dL


 


Glucose     (74-99)  mg/dL


 


Hemoglobin A1c  7.4 H    (4.0-6.0)  %


 


Calcium     (8.4-10.2)  mg/dL


 


Delta Bilirubin     (0.0-0.2)  mg/dL


 


ALT     (21-72)  U/L


 


Total Protein     (6.3-8.2)  g/dL


 


Albumin     (3.5-5.0)  g/dL


 


PTH Intact   114.2 H   (14.0-72.0)  pg/mL














  02/13/18 Range/Units





  06:00 


 


RBC   (4.30-5.90)  m/uL


 


Hgb   (13.0-17.5)  gm/dL


 


Hct   (39.0-53.0)  %


 


Plt Count   (150-450)  k/uL


 


Lymphocytes #   (1.0-4.8)  k/uL


 


Sodium  136 L  (137-145)  mmol/L


 


BUN  64 H  (9-20)  mg/dL


 


Creatinine  1.31 H  (0.66-1.25)  mg/dL


 


Glucose  175 H  (74-99)  mg/dL


 


Hemoglobin A1c   (4.0-6.0)  %


 


Calcium  8.1 L  (8.4-10.2)  mg/dL


 


Delta Bilirubin  0.3 H  (0.0-0.2)  mg/dL


 


ALT  85 H  (21-72)  U/L


 


Total Protein  5.3 L  (6.3-8.2)  g/dL


 


Albumin  2.9 L  (3.5-5.0)  g/dL


 


PTH Intact   (14.0-72.0)  pg/mL














Assessment and Plan


Plan: 


Assessment and plan


#1 symptoms of diarrhea with associated progressive worsening weakness.


#2 chest pain approximately one week ago, relieved with omeprazole.  Troponin 

0.2, 0.2, 0.2.  Initial EKG shows normal sinus rhythm with nonspecific ST-T 

wave changes in the lateral leads.  Subsequent EKG shows atrial fibrillation 

with a moderately rapid ventricular response


#3 atrial fibrillation, paroxysmal, appears to be new for the patient


#4 history of DVT and PE for which the patient is on Eliquis


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


#6 hypertension


#7 hyperlipidemia


#8 history of viral hepatitis


#9 acute renal failure, likely secondary to dehydration from diarrhea


#10 nicotine dependence





Plan


Patient's troponin abnormality not consistent with acute coronary syndrome with 

no significant rise and fall pattern.  Could be secondary to oxygen supply and 

demand mismatch.  Echocardiogram reveals normal left ventricular systolic 

function.  From cardiology's perspective, we will follow this patient along 

with you now on an as-needed basis only, please don't hesitate to call with any 

questions.


DNP note has been reviewed, I agree with a documented findings and plan of 

care.  Patient was seen and examined.

## 2018-02-13 NOTE — PN
PROGRESS NOTE



DATE OF SERVICE:

02/13/2018



DATA:

Height 5 feet 6 inches, weight 102.1 kg, BSA 2.10 sq meter. Currently the BMI is 36.3

kg/sq meter. ALLERGY IS UNKNOWN.



The patient is seen today, evaluated. Discussed with his niece and nephew, as the

patient did not have any other family member, and she is the caregiver.



Extended discussion with the family as well as Dr. Carter, the cardiologist.



The patient was admitted with generalized weakness and was found to have underlying non-

Q MI. However, in discussion with Dr. Carter he indicated that it did not fit the

pattern of the acute MI; however, that could be stress-induced myocardial injury.  He

also stated that the patient would be planned for that discussed in planning/discharge,

and he at this time has no further discussion in regard to patient being discharged,

and the patient can be discharged.



I did consult Oncology, and Dr. Hudson saw the patient. Probably his PA dictated the note

on the patient with the impression that the patient had lung carcinoma with metastasis

to the brain and that chemotherapy is on hold until patient is discharged, stabilized,

and follows with Dr. Hudson as outpatient.  The patient also has underlying congestive

heart failure, diastolic, and generalized weakness and normocytic anemia, probably

secondary to the chemotherapy and current malignancy. He has thrombocytopenia. The plan

as written by CHRISTIANA Riley was supposedly discussed with Dr. Hudson as well.  We did

not clarify; Dr. Hudson did not clarify the issue of the use of dexamethasone and the

coverage for it as outpatient.



We did consult Dr. Walden also for the same reason, the need to continue the

dexamethasone as outpatient as well until he sees him.  We do not have an opinion yet,

as the patient was not seen by Dr. Walden, the radiation oncologist.  The stool for C

difficile was negative.  Cardiology, Dr. Carter, did speak with the caregiver, and

their impression was dictated by Dr. Ann Cruz, acting as the nurse practitioner for

Cardiology. She mentioned the symptom of diarrhea associated with progressive worsening

and weakness, which has now become solid to pasty and no C difficile. Chest pain one

week before was relieved by omeprazole.  However, the troponin was elevated with normal

sinus rhythm and nonspecific ST-T wave changes in the lateral leads. Another EKG showed

atrial fibrillation with moderately rapid ventricular response, atrial fibrillation,

paroxysmal, new for the patient.  He has a history of DVT and PE.  Currently patient is

on Eliquis to support that. Hyperlipidemia, hypertension, history of viral hepatitis,

acute renal failure secondary to dehydration, and nicotine dependence.  The

echocardiogram revealed normal left ventricular function.  Dr. Carter will be

talking to the patient and his family as well.



On today's examination, his vital signs indicate temperature 97.3, pulse 86 beats per

minute, respiratory rate 16 and blood pressure 108/60 and mean is 76.  Prior to that

today, blood pressure was 133/70 with oxygen saturation 99% and subsequently 100%.



LABS:

Today his white count is 5.7 and hemoglobin 11.6 with a drop of 0.6 only and hematocrit

is 37.  His sodium is 136. BUN is slightly improved and creatinine as well to 64 and

creatinine 1.31.  Estimated glomerular filtration rate is 53; it was yesterday 43.  His

blood sugar was 263, with the hyperglycemia secondary to dexamethasone, steroid-

induced, and he is covered currently with insulin to scale.  However, we are requesting

the answer for the use of dexamethasone; if it needs to be discontinued and if its use

has been stopped by Oncology or Radiation Oncology. His hemoglobin A1c is 7.4, which

indicates diabetes that could be because of the steroid.  His uric acid is normal.

Calcium is normal and total bilirubin is normal.  His GGT is 63, which is normal.  AST

36 and ALT 85. We requested the viral hepatitis profile.  The result is not available

yet. On admission he had elevated CPK at 7.2 and the index also was normal.  However,

the troponin I was 0.271.  He had hyperparathyroidism and his the PTH is 114.2, his

proBNP 4820 with the underlying diastolic failure, normal systolic failure by the

echocardiogram; however, he had shortness of breath on admission.  His lipid profile

indicates cholesterol 148, LDL 64, HDL 36.  Urine analysis was negative.  His hepatitis

A IgM was reactive and the hepatitis B surface antigen non-reactive and hepatitis core

IgM non-reactive and hepatitis C non-reactive.



PHYSICAL EXAMINATION:

The patient was conscious, alert, slightly slow, lying in the bed and dangling his legs

off the bed.  He had underlying complaints of abdominal distention, and his niece

stated that he has a habit of drinking at night and he drinks whiskey. For that we did

an ultrasound of the liver.  Ultrasound of the liver indicated no cholelithiasis,

however, it is indicating hepatic steatosis, probably fatty liver the issue. No

evidence of liver metastasis.



On physical examination, as mention above, he was conscious, alert.  He was oriented to

person and place. At the same time, he had underlying mild hyperglycemia from the

steroid.

HEENT was negative.

Neck was supple.

Chest was clear to auscultation and percussion with the underlying right upper lung

lobe presence of a tumor mass, which already has been diagnosed, and he has metastasis

to the brain, for which he is planned for radiation in [facility].

ABDOMEN:  Obese. Positive bowel sounds.

EXTREMITIES:  No edema.  Positive pulses.



The patient denied any chest pain or anginal pain. He has some underlying forgetfulness

that was thought to be associated with the brain metastasis.



We have the ultrasound of the abdomen that was indicating increased attenuation of the

liver and the conclusion of hepatic steatosis. He had renal parenchymal thinning with

cystic changes bilaterally.  The size of the liver was 15.9 by the ultrasound, size of

the spleen 11, and the right kidney size 10.6 x 6.2 x 6.1, the left kidney 12.0 x 6.6 x

5.9 with the decreased renal cortex with multiple cysts.  The largest cyst on the right

kidney measured 3.4 x 4 x 2.6. The largest cyst in the left kidney measured 3.6 x 3.2 x

2.7.  As mentioned, the patient underwent an echocardiogram that showed ejection

fraction 55% to 60%, normal left ventricular function; with the elevated BNP, it could

possibly be from the diastolic failure.  The chest x-ray showed COPD and mild

cardiomegaly.  He had a right-sided pleural reaction, likely representing fluid.

However, he had cancer on the right lung with shortness of breath.



ASSESSMENT:

1. Reactive IgM hepatitis A. Will consult Infectious Disease for evaluation and input

    on that.

2. Acute non-Q myocardial infarction with possibly leak of cardiac enzymes, as cardiac

    injury could be from stress.

3. Diastolic congestive heart failure.

4. Chronic kidney disease, stage III, improved.

5. Adenocarcinoma of the right upper lung.  Patient was on chemotherapy, which he

    stopped in January and planned for radiation of the brain due to metastasis to the

    brain.

6. History of intermittent paroxysmal atrial fibrillation, currently on Eliquis 5 mg

    twice a day, Atorvastatin, Carvedilol, dexamethasone, which has been decreased to 2

    mg t.i.d. and insulin to scale and Losartan as well as nitroglycerine and pain

    medication, which was not given.

7. Benign prostatic hypertrophy.



PLAN:

Will consult Infectious Disease, Dr. Morales, for evaluation and treatment for positive

hepatitis A IgM.





MMODL / IJN: [id number] / Job#: [id number]

## 2018-02-13 NOTE — US
EXAMINATION TYPE: US abdomen complete

 

DATE OF EXAM: 2/13/2018

 

COMPARISON: CT, US 2017

 

CLINICAL HISTORY: abdominal mets, rt lung ca with brain mets.

 

EXAM MEASUREMENTS:

 

Liver Length:  15.9 cm   

Gallbladder Wall:  0.1 cm   

CBD:  0.2 cm

Spleen:  11.0 cm   

Right Kidney:  10.6 x 6.2 x 6.1 cm 

Left Kidney:  12.0 x 6.6 x 5.9 cm   

 

 

 

Pancreas:  Obscured by bowel gas

Liver:  Increased attenuation, ? area of focal sparing near GB  

Gallbladder:  wnl

**Evidence for sonographic Roland's sign:  No

CBD:  wnl 

Spleen:  Increased attenuation, borders difficult to see.   

Right Kidney:  decreased renal cortex with multiple cysts as seen previously. Largest cyst =  3.4 x 3
.4 x 2.6 cm    

Left Kidney:  decreased renal cortex with multiple cysts as seen previously. Largest cyst =3.6 x 3.2 
x 2.7 cm   

Upper IVC:  wnl  

Abd Aorta:  wnl

 

 

 

The liver is heterogenous and this may reflect hepatic steatosis. The intrahepatic portion of the IVC
 and proximal abdominal aorta are within normal limits.  There is no evidence of cholelithiasis.  Com
mon bile duct is unremarkable.  The visualized portions of the pancreas are homogenous.  The spleen i
s unremarkable.  Kidneys are symmetric and free of hydronephrosis.  No renal lesions are seen.

 

IMPRESSION: 

1. Hepatic steatosis.

2. Renal parenchymal thinning and renal cystic changes.

## 2018-02-14 LAB
ANION GAP SERPL CALC-SCNC: 10 MMOL/L
BASOPHILS # BLD AUTO: 0 K/UL (ref 0–0.2)
BASOPHILS NFR BLD AUTO: 0 %
BUN SERPL-SCNC: 65 MG/DL (ref 9–20)
CALCIUM SPEC-MCNC: 8.1 MG/DL (ref 8.4–10.2)
CHLORIDE SERPL-SCNC: 100 MMOL/L (ref 98–107)
CO2 SERPL-SCNC: 23 MMOL/L (ref 22–30)
EOSINOPHIL # BLD AUTO: 0 K/UL (ref 0–0.7)
EOSINOPHIL NFR BLD AUTO: 0 %
ERYTHROCYTE [DISTWIDTH] IN BLOOD BY AUTOMATED COUNT: 3.63 M/UL (ref 4.3–5.9)
ERYTHROCYTE [DISTWIDTH] IN BLOOD: 12.8 % (ref 11.5–15.5)
GLUCOSE BLD-MCNC: 111 MG/DL (ref 75–99)
GLUCOSE BLD-MCNC: 150 MG/DL (ref 75–99)
GLUCOSE BLD-MCNC: 197 MG/DL (ref 75–99)
GLUCOSE BLD-MCNC: 202 MG/DL (ref 75–99)
GLUCOSE BLD-MCNC: 220 MG/DL (ref 75–99)
GLUCOSE SERPL-MCNC: 148 MG/DL (ref 74–99)
HCT VFR BLD AUTO: 35.8 % (ref 39–53)
HGB BLD-MCNC: 11.4 GM/DL (ref 13–17.5)
LYMPHOCYTES # SPEC AUTO: 0.4 K/UL (ref 1–4.8)
LYMPHOCYTES NFR SPEC AUTO: 6 %
MAGNESIUM SPEC-SCNC: 2 MG/DL (ref 1.6–2.3)
MCH RBC QN AUTO: 31.3 PG (ref 25–35)
MCHC RBC AUTO-ENTMCNC: 31.8 G/DL (ref 31–37)
MCV RBC AUTO: 98.6 FL (ref 80–100)
MONOCYTES # BLD AUTO: 0.2 K/UL (ref 0–1)
MONOCYTES NFR BLD AUTO: 4 %
NEUTROPHILS # BLD AUTO: 5.1 K/UL (ref 1.3–7.7)
NEUTROPHILS NFR BLD AUTO: 89 %
PLATELET # BLD AUTO: 97 K/UL (ref 150–450)
POTASSIUM SERPL-SCNC: 4.3 MMOL/L (ref 3.5–5.1)
SODIUM SERPL-SCNC: 133 MMOL/L (ref 137–145)
WBC # BLD AUTO: 5.7 K/UL (ref 3.8–10.6)

## 2018-02-14 RX ADMIN — CARVEDILOL SCH MG: 12.5 TABLET, FILM COATED ORAL at 12:39

## 2018-02-14 RX ADMIN — APIXABAN SCH MG: 5 TABLET, FILM COATED ORAL at 08:38

## 2018-02-14 RX ADMIN — CARVEDILOL SCH MG: 6.25 TABLET, FILM COATED ORAL at 17:17

## 2018-02-14 RX ADMIN — INSULIN ASPART SCH: 100 INJECTION, SOLUTION INTRAVENOUS; SUBCUTANEOUS at 12:44

## 2018-02-14 RX ADMIN — ATORVASTATIN CALCIUM SCH MG: 20 TABLET, FILM COATED ORAL at 19:51

## 2018-02-14 RX ADMIN — LOSARTAN POTASSIUM SCH: 50 TABLET, FILM COATED ORAL at 12:40

## 2018-02-14 RX ADMIN — IPRATROPIUM BROMIDE AND ALBUTEROL SULFATE SCH ML: .5; 3 SOLUTION RESPIRATORY (INHALATION) at 19:26

## 2018-02-14 RX ADMIN — IPRATROPIUM BROMIDE AND ALBUTEROL SULFATE SCH ML: .5; 3 SOLUTION RESPIRATORY (INHALATION) at 15:35

## 2018-02-14 RX ADMIN — DEXAMETHASONE SCH MG: 4 TABLET ORAL at 17:17

## 2018-02-14 RX ADMIN — INSULIN ASPART SCH UNIT: 100 INJECTION, SOLUTION INTRAVENOUS; SUBCUTANEOUS at 17:18

## 2018-02-14 RX ADMIN — TAMSULOSIN HYDROCHLORIDE SCH MG: 0.4 CAPSULE ORAL at 19:51

## 2018-02-14 RX ADMIN — ASPIRIN 81 MG CHEWABLE TABLET SCH MG: 81 TABLET CHEWABLE at 08:38

## 2018-02-14 RX ADMIN — IPRATROPIUM BROMIDE AND ALBUTEROL SULFATE SCH ML: .5; 3 SOLUTION RESPIRATORY (INHALATION) at 11:10

## 2018-02-14 RX ADMIN — INSULIN ASPART SCH: 100 INJECTION, SOLUTION INTRAVENOUS; SUBCUTANEOUS at 06:50

## 2018-02-14 RX ADMIN — APIXABAN SCH MG: 5 TABLET, FILM COATED ORAL at 19:51

## 2018-02-14 RX ADMIN — LOSARTAN POTASSIUM SCH MG: 25 TABLET, FILM COATED ORAL at 12:47

## 2018-02-14 RX ADMIN — INSULIN ASPART SCH: 100 INJECTION, SOLUTION INTRAVENOUS; SUBCUTANEOUS at 20:29

## 2018-02-14 RX ADMIN — DEXAMETHASONE SCH MG: 4 TABLET ORAL at 08:39

## 2018-02-14 RX ADMIN — OXYCODONE AND ACETAMINOPHEN SCH: 5; 325 TABLET ORAL at 21:43

## 2018-02-14 RX ADMIN — DEXAMETHASONE SCH MG: 4 TABLET ORAL at 23:06

## 2018-02-14 RX ADMIN — OXYCODONE AND ACETAMINOPHEN SCH EACH: 5; 325 TABLET ORAL at 17:15

## 2018-02-14 RX ADMIN — OXYCODONE AND ACETAMINOPHEN SCH EACH: 5; 325 TABLET ORAL at 08:39

## 2018-02-14 RX ADMIN — CARVEDILOL SCH MG: 6.25 TABLET, FILM COATED ORAL at 12:46

## 2018-02-14 NOTE — XR
EXAMINATION TYPE: XR chest 2V

 

DATE OF EXAM: 2/14/2018

 

COMPARISON: 2/11/2018

 

HISTORY: 77-year-old male crackles and increased shortness of breath

 

TECHNIQUE:  Frontal and lateral views

 

FINDINGS:  

Heart mildly enlarged. Small effusions with patchy posterior basilar density on the lateral view. Mil
d elongation of the thoracic aorta. Upper to mid lungs are relatively clear.

 

 

IMPRESSION:  

 

1. Mild cardiomegaly.

2. Given the trace effusions, correlate for possible mild CHF.

3. Focal posterior basilar atelectasis or infiltrate slightly increased.

## 2018-02-14 NOTE — CONS
CONSULTATION



DATE OF CONSULTATION:

02/14/2018.



REASON FOR CONSULTATION:

Acute hepatitis A.



HISTORY OF PRESENT ILLNESS:

The patient is a 77-year-old  male who was initially diagnosed with 
acute

hepatitis C back in December of 2017 on the blood test.  The patient did having 
some

gastrointestinal symptom at that time and has been treated symptomatically.  The

patient is now presenting to the [facility] ER on 02/11/2018 with chief

complaints of loose stools and generalized weakness.  The patient did mention 
that has

no energy and was feeling weak and tired.  However, the patient denies any high-
grade

fever, rigors or chills.  Denies significant chest pain.  Some shortness of 
breath.

Very minimal cough, but not bringing up any sputum.  The patient denies any 
abdominal

pain.  No nausea, vomiting.  He did still have some soft bowel movement, but did

mention not runny about 3-4 episodes per day with no blood, no mucus in it.  The

patient did say because of his wiping it, his hemorrhoid he did notice some 
blood at

the end of the stools.  The patient has been evaluated by multiple consultants 
for his

underlying condition as the patient did have a positive troponin and is being 
seen by

Cardiology.  In addition to the Oncology for underlying lung cancer.  The 
patient has

been afebrile throughout his hospital stay.  The patient did have a normal 
white count

since admission.  He was noticed to have elevated ALT of 102 on the 11th that 
was down

to 85, as of yesterday.  The patient did have hepatitis serology done with 
hepatitis C

IgM antibody become reactive hence I was asked to see the patient for further

recommendation.



REVIEW OF SYSTEMS:

Constitutional:  Positive for weakness, but no fever.  Eyes no complaint.  ENT 
no

complaint.  Respiratory no complaint.  Cardiovascular no complaint. 
Genitourinary no

complaint.  Gastrointestinal:  As per HPI.  Musculoskeletal:  No complaint.

Integumentary:  No complaint.  Psychological: No complaint.  Endocrine: No 
complaint.

Neurologic no complaint.



PAST MEDICAL HISTORY:

Significant for metastatic lung cancer, spinal stenosis,  hepatitis A and renal

insufficiency.



PAST SURGICAL HISTORY:

Hernia repair, rotator cuff repair, colonoscopy and biopsy.



SOCIAL HISTORY:

Remote history of smoking.  No drinking or drug use.



FAMILY HISTORY:

No pertinent findings noticed.



ALLERGIES:

No known drug allergies.



MEDICATION:

Include the patient is currently on:

1. DuoNeb.

2. Allopurinol.

3. Eliquis.

4. Aspirin.

5. Lipitor.

6. Coreg.

7. Dexamethasone.

8. NovoLog.

9. Cozaar.

10.Nitrostat.

11.Percocet.

12.Flomax.



EXAMINATION:

Blood pressure is 94/50 with a pulse of 83, temperature of 97.8.  He is 98% on 
room

air. General description is an elderly male up in the bed, up in no distress.  
No

tachypnea or accessory muscle of respiration use.

HEENT:  Shows slight pallor.  No scleral icterus.  Oral mucosa membranes dry.  
Neck

trachea central.  No thyromegaly.  Lungs unlabored breathing, decreased 
intensity of

breath sounds.  No wheeze.  Heart S1, S2.  Regular rate and rhythm.

ABDOMEN:  Soft, very minimal tenderness in the right upper quadrant area.  No 
guarding.

No rigidity.  Extremities:  Some trace edema of the feet. Skin examination:  No 
rash,

or mass palpable.  Neurological:  The patient is awake, alert, oriented times 
three.

Mood and affect normal.



LABS:

Hemoglobin 11.4, white count 5.7, BUN of 65, creatinine 1.19, ALT is down to 
85.  Urine

is negative.  The patient did have an abdominal ultrasound.  Hepatic steatosis 

renal cystic changes.  Chest x-ray done on admission shows right-sided pleural

reaction, likely representing fluid.  Mild cardiomegaly.



DIAGNOSTIC IMPRESSION/PLAN:

1. Patient with hepatitis A IgM antibody positive more likely due to his recent

    episode of hepatitis C diagnosed back in December. It is normal to have 
elevated

    IgM for a few weeks after the acute episode.  Clinically doubt any 
worsening acute

    hepatitis A or  failure as the patient ALT slightly coming down, very 
minimal

    tenderness right upper quadrant area, but no fever, no chills.  No elevated 
white

    count.

2. Patient with generalized weakness could be multifactorial and could be 
possibly

    contributing factor will be the diarrhea and dehydration in addition to the 
other

    cardiac and pulmonary conditions  the patient has and being managed by the 
other

    consultant.



PLAN:

1. No specific treatment  for acute hepatitis A should be mostly symptomatic.  
Clinically

    doubt any worsening of his underlying hepatitis.

2. For the diarrhea, we will check stool studies and add Questran for 
symptomatic

    relief.

3. We will follow up on clinical condition and further adjust medication if 
needed.

Thank you for this consultation. We will follow the patient along with you.





MMODL / IJN: [id number] / Job#: [id number]

[facility]

## 2018-02-14 NOTE — P.CONS
History of Present Illness





- Chief Complaint


Medical debility





- History of Present Illness





I had the arm to see patient for inpatient rehab consultation with regard to 

medical debility.  He was admitted to [facility]  with shortness of 

breath, weakness, diarrhea.  Seen by cardiology for elevated troponin and 

possible non-STEMI.  Seen by pulmonary for known pulmonary cancer with 

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  Seen by Dr. Walden for completion of radiosurgery.  

Chest x-ray demonstrates mild cardiomegaly, trace effusions and basilar 

atelectasis.  Abdominal ultrasound demonstrates fatty liver.  Abdominal x-ray 

demonstrates COPD, mild cardiomegaly and mild right sided effusion.  Negative 

abdomen.  PT reports supervision for functional debility, transfers, gait 100 

feet with roller walker.  OT reports supervision for upper/lower dressing, 

bathing, toileting, functional mobility.





Previous functional history as elicited from patient: 77-year-old right-handed 

white male who is single lives and one for home with nephew.  Retired.





You does the cooking, laundry, driving.  Patient describes independent with 

standing shower and gait with 4 wheeled walker.  Dr. Claire is regular doctor.





Family history of mother with cancer.





Review of Systems





Review of systems:


ENT: Denies sneezes or discharge.


Eyes: Denies discharge or photophobia.


Cardiac: Denies chest pain or palpitation.


Pulmonary: Intermittent or resolving shortness of breath.


Gastrointestinal: Denies nausea, emesis, constipation, diarrhea.


Genitourinary: Denies discharge or frequency.


Musculoskeletal: Denies muscle or bone aches.


Neurologic: At most, mild weakness.


Endocrine: Denies shakes or sweats.


Oncology: Denies cancers.


Dermatologic: Denies rash, itching, pruritus.


ALLERGY/immunology: Denies sneezes, rashes.








Past Medical History


Past Medical History: Cancer, Hyperlipidemia, Hypertension, Pulmonary Embolus (

PE), Renal Disease


Additional Past Medical History / Comment(s): lung ca,stage 3 kidney brain ca, 

spinal stenosis, kidney stenosis, hep a and hepb.  last chemo   and 

radiation last week was scheduled, did not go, gout.


History of Any Multi-Drug Resistant Organisms: None Reported


Year Discovered:: None


MDRO Source:: None


Past Surgical History: Hernia Repair


Additional Past Surgical History / Comment(s): rotator cuff


Past Psychological History: No Psychological Hx Reported


Smoking Status: Current every day smoker


Past Alcohol Use History: None Reported


Additional Past Alcohol Use History / Comment(s): Patient has one cocktail 

every night around 5pm.


Past Drug Use History: None Reported





- Past Family History


  ** Mother


Family Medical History: Cancer


Additional Family Medical History / Comment(s):  of uterine cancer





  ** Father


Family Medical History: No Reported History





Medications and Allergies


 Home Medications











 Medication  Instructions  Recorded  Confirmed  Type


 


Allopurinol [Zyloprim] 100 mg PO Q48H 18 History


 


Apixaban [Eliquis] 5 mg PO BID 18 History


 


Atorvastatin [Lipitor] 20 mg PO HS 18 History


 


Carvedilol [Coreg] 12.5 mg PO BID 18 History


 


Chlorthalidone 25 mg PO DAILY 18 History


 


Losartan Potassium [Cozaar] 100 mg PO DAILY 18 History


 


Tamsulosin [Flomax] 0.4 mg PO HS 18 History


 


amLODIPine [Norvasc] 5 mg PO HS 18 History


 


oxyCODONE-APAP 5-325MG [Percocet 1 tab PO TID 18 History





5-325 mg]    











 Allergies











Allergy/AdvReac Type Severity Reaction Status Date / Time


 


No Known Allergies Allergy   Verified 18 12:45














Physical Exam


Vitals: 


 Vital Signs











  Temp Pulse Pulse Resp BP Pulse Ox


 


 18 11:37   75    


 


 18 11:19   74    


 


 18 11:10   72    


 


 18 08:00  97.8 F   83  16  94/50  98


 


 18 04:00  97.9 F   82  16  106/51  97


 


 18 00:00  97.8 F   79  16  102/55  97


 


 18 20:00  98.2 F   80  16  129/56  100


 


 18 16:00     16  


 


 18 15:49  97.3 F L   86  16  108/60  100








 Intake and Output











 18





 22:59 06:59 14:59


 


Intake Total 240  280


 


Output Total 200 900 


 


Balance 40 -900 280


 


Intake:   


 


  Oral 240  280


 


Output:   


 


  Urine 200 900 


 


Other:   


 


  Voiding Method Urinal Urinal 


 


  # Voids  2 1


 


  # Bowel Movements   2


 


  Weight  99.2 kg 














Skin: Mildly atrophic, intact


General: Overweight build and comfortable appearance.


Head: Normocephalic, atraumatic.


Eyes: Symmetric.  Pupils equal round.


Ears: Symmetric.  Hearing within normal limits.


Mouth: Clear.


Neck: Supple.  Carotid without bruit.


Cardiac: Regular rate and rhythm.


Lungs: Clear anteriorly and posteriorly.


Abdomen: Soft active nontender.


Extremities: Normal tone.


Neurological: Mental status: Alert, cooperative, pleasant.


Cranial nerves: Symmetric facial tone and trapezius.


Motor: Normal strength and isolation all 4 limbs.


Sensation: Intact throughout.


DTRs: Symmetric and equal throughout.


Mobility: Sits and stands with standby assistance but no verbal cueing or loss 

of balance.  Reports intermittently going a bathroom on own and intermittently 

with assistance.  Using walker at bedside.





Results


CBC & Chem 7: 


 18 05:31





 18 05:31


Labs: 


 Abnormal Lab Results - Last 24 Hours (Table)











  18 Range/Units





  06:00 16:25 20:47 


 


RBC     (4.30-5.90)  m/uL


 


Hgb     (13.0-17.5)  gm/dL


 


Hct     (39.0-53.0)  %


 


Plt Count     (150-450)  k/uL


 


Lymphocytes #     (1.0-4.8)  k/uL


 


Sodium     (137-145)  mmol/L


 


BUN     (9-20)  mg/dL


 


Glucose     (74-99)  mg/dL


 


POC Glucose (mg/dL)   263 H  146 H  (75-99)  mg/dL


 


Calcium     (8.4-10.2)  mg/dL


 


Hepatitis A IgM Ab  Reactive H    (Non-Reactive)  














  18 Range/Units





  00:20 05:31 05:31 


 


RBC   3.63 L   (4.30-5.90)  m/uL


 


Hgb   11.4 L   (13.0-17.5)  gm/dL


 


Hct   35.8 L   (39.0-53.0)  %


 


Plt Count   97 L   (150-450)  k/uL


 


Lymphocytes #   0.4 L   (1.0-4.8)  k/uL


 


Sodium    133 L  (137-145)  mmol/L


 


BUN    65 H  (9-20)  mg/dL


 


Glucose    148 H  (74-99)  mg/dL


 


POC Glucose (mg/dL)  197 H    (75-99)  mg/dL


 


Calcium    8.1 L  (8.4-10.2)  mg/dL


 


Hepatitis A IgM Ab     (Non-Reactive)  














  18 Range/Units





  05:58 11:59 


 


RBC    (4.30-5.90)  m/uL


 


Hgb    (13.0-17.5)  gm/dL


 


Hct    (39.0-53.0)  %


 


Plt Count    (150-450)  k/uL


 


Lymphocytes #    (1.0-4.8)  k/uL


 


Sodium    (137-145)  mmol/L


 


BUN    (9-20)  mg/dL


 


Glucose    (74-99)  mg/dL


 


POC Glucose (mg/dL)  150 H  111 H  (75-99)  mg/dL


 


Calcium    (8.4-10.2)  mg/dL


 


Hepatitis A IgM Ab    (Non-Reactive)  











Chest x-ray: report reviewed (Mild cardiomegaly, trace effusions, basilar 

atelectasis.)


Abdominal x-ray: report reviewed (COPD, mild cardiomegaly, small right pleural 

effusion.  Abdomen without acute process.)


US - abdomen: report reviewed (Fatty liver only.)





Assessment and Plan


(1) Non-ST elevation myocardial infarction (NSTEMI)


Current Visit: Yes   Status: Acute   Code(s): I21.4 - NON-ST ELEVATION (NSTEMI) 

MYOCARDIAL INFARCTION   SNOMED Code(s): 406398322


   


Plan: 





Impression:


1.  Medical debility.


2.  Elevated troponin, non-STEMI.


3.  Diarrhea.


4.  Pulmonary cancer with metastases to brain.


5.  Hypertension.


6.  Dysmetria.


7.  Chronic kidney disease.





Comments and plan: At this time PT and OT are ongoing.  Patient is really only 

supervision, that is no physical assistance or loss of balance noted.  Patient 

thus doing well.  Anticipate home with support services pending her discretion.

## 2018-02-14 NOTE — TCOM
Last Revision, December 2017





Documentation Clarification Form



Date: 2/14/2018 9:54:00 AM

From: Gabby lE RN, CCDS

Phone: [phone]

MRN: [id number]

Admit Date: 2/11/2018 4:07:00 PM

Patient Name: Cedrick Zuluaga 

Visit Number: [id number]



ATTENTION: The Clinical Documentation Specialists (CDI) and [facility] Coding Staff 
appreciate your assistance in clarifying documentation. Please respond to the 
clarification below the line at the bottom and electronically sign. The CDI & 
[facility] Coding staff will review the response and follow-up if needed. Please note: 
Queries are made part of the Legal Health Record. If you have any questions, 
please contact the author of this message via ITS.



Dr. Prabhakar Claire



Conflicting documentation has been identified in the medical record, please 
review and provide clarification.



History/Risk Factors:

Chief Complaint: Generalized weakness and SOB

Lung Cancer with mets to the brain receiving Chemo and Radiation, Hepatitis A&B
, Hyperlipidemia, HTN, Acute on chronic renal failure



Clinical Indicators:

Cardiology Consult and Progress Notes: "chest pain approximately one week ago, 
relieved with omeprazole. Troponin 0.2, 0.2, 0.2. Initial EKG shows normal 
sinus rhythm with nonspecific ST-T wave changes in the lateral leads."



Attending H&P and Progress Notes:  Acute non-Q myocardial infarction with 
possibly leak of cardiac enzymes, as cardiac injury could be from stress. 



Attending H&P: "Apparently he had a course of dexamethasone and subsequently 
developed some chest discomfort which stool was described as epigastric and 
chest burning usually with and after food. Patient was apparently started on 
omeprazole which did clear his symptomatology. 



Treatment:

ASA, Lipitor, Lasix, Lopressor, Tyelenol #3, Aldactone, 

Tums PRN



In your opinion what is the most clinically appropriate diagnosis for this 
patient?  



Acute Non-Q Wave MI Ruled in

Acute Non- Q wave MI Ruled Out

Chest pain d/t GERD

Chest pain d/t other specified cause

Other explanation of clinical findings

Unable to determine (no explanation for clinical findings)



Please continue to document in your progress notes and discharge summary in 
order to capture severity of illness and risk of mortality. Include clinical 
findings that support your diagnosis.

___________________________________________________________________
MTDD

## 2018-02-14 NOTE — PN
PROGRESS NOTE



His ALLERGY is unknown and his data is 5 foot 6 inches, weight 99.2 kg, BSA 2.08 m2,

BMI 35.3 kg/m2.



DATE OF SERVICE:

02/14/2018



The patient is seen today face-to-face, discussed with the patient today and examined.

On the examination, he just came out of the bathroom, which is in his room and coming

out to the bed, severely short of breath and with the few steps, not more than 7 to 8

steps. He stated that he ready for rehabilitation as an inpatient, which is new to me

and we will be consulting Dr. Chandler the inpatient rehabilitation.  Dr. Leroy Chandler MD

podiatrist.  Patient may need also rehab cardiac wise as well in the future after he

has some stamina.



On the physical examination, his vital sign indicating that he has blood pressure 94/50

with the mean pressure of 64.  His oxygen saturation 98 on room air and his respiratory

rate 16, and pulse rate 83, temperature 97.8.

On physical examination, patient is conscious, alert.  However, he is sluggish.  He has

moving 4 extremities.  No evidence of stroke; however, he had 2 metastasis to the

brain.  Neck was supple.  No JVD and his face slightly in the moon face secondary to

the dexamethasone steroid which was originally started by Dr. Walden, the Radiation

Oncology because of the vasogenic edema around these metastasis which is 2 of them and

he planned at that time to have 3 station of radiation therapy on the brain; however,

has been interrupted with the admission to the hospital.  When he came to the hospital,

they did not recognize that he was on steroids, dexamethasone.  Subsequently, as I did

receive the service on Monday, found out that he was taking dexamethasone 6 mg t.i.d.,

and we continued that to avoid adrenal insufficiency and that was for the vasogenic

edema of the metastasis to the brain, was initially ordered by Dr. Patiño. We consulted

subsequently Dr. Patiño and he decreased the dose to 2 mg t.i.d. and that was the reason

for the change.  Patient has a history of GERD disease and we placed him on the

Protonix with his symptoms resolved.  Continuation and that will be answered of the

query question that I did receive from the Gabby Pink RN CC DS.  Next, his lung on

the auscultation today, he has rhonchi and rhonchi is bilateral and he has mild cough

with it, was suspicious as well as the hypotension.  We did ask for a chest x-ray to

clarify that issue and he had laboratory will be reviewed at the end of the dictation.

The heart, he has history of regular sinus rhythm; however, he had intermittent

paroxysmal atrial fibrillation and they placed him on the novel anticoagulation.  The

patient has sinus rhythm.  The abdomen is obese and positive bowel sounds and we did

the ultrasound of the abdomen with the indication of hepatic steatosis.  Extremities,

he had evidence of arthritis, positive pulses and varicose veins and minimal to trace

edema.  Neurologically is stable with the metastasis to the brain.



DIAGNOSES:

1. Generalized weakness and shortness of breath as he presented to the emergency room.

2. He had right upper lung adenocarcinoma and metastasis to the brain with the 2 spots

    which are planned for radiation and withholding the chemotherapy.

3. He had on the admission abnormal troponin.  At that time on admission was admitted

    by Dr. Laughlin and the diagnosis was acute non-Q myocardial infarction.  Also as

    discussed with Dr. Carter, he believed that this is myocardial injury and with

    the stress and supply and demand further clarification could be discussed with Dr. Carter.  He stated that his non-Q myocardial infarction does not affect the

    pattern of the elevation of the cardiac enzyme.  With our conclusion for the coding

    would consider acute non Q myocardial infarction.

4. His epigastric and stomach discomfort was associated with GERD disease which

    omeprazole was given and resolved.

5. Patient has also other laboratory abnormalities on admission.  He had diastolic

    congestive heart failure as well with elevated troponin and elevated BNP and also

    underlying acute renal failure as his creatinine was 1.58 with a BUN 77, could be

    associated prerenal as well.  His hemoglobin A1c was 7.4, added to diagnosis of

    diabetes mellitus, could be associated with the use of dexamethasone considered;

    however, patient is on insulin to scale and need diabetic education as well and a

    CBG monitor at home or in the rehab, if he goes to the rehabilitation and cover to

    scale.

6. His ultrasound of the liver showed hepatic steatosis as well.  No metastasis to the

    brain.  His uric acid is normal 6.5 and stable.  Patient had no leukocytosis and he

    had anemia of chronic disease as he had cancer and treated with chemotherapy, now

    is held.  Patient also had history of alcohol intake and he has thrombocytopenia,

    could be from the chemo or from previous history of alcohol intake.  Currently, he

    is also hyperglycemic and covered with insulin.

On admission his ALT was elevated 103 and subsequently 85.  His CK-MB was 7.2, and the

troponin one was 0.271.  Also, he had another diagnosis. PTH elevated 114.2 secondary

to chronic kidney disease stage 3 with the underlying acute kidney injury, subsequently

improving and the current lab on 2/14 indicating that his creatinine back to the normal

range 1.19 with the BUN of 65, and estimated glomerular filtration rate 59.  He had

documented ultrasound as he had with the underlying kidney disease and that was clearly

mentioned in the ultrasound as we noted and they have in the ultrasound the liver

heterogeneous reflect hepatic steatosis as well as the right kidney, decreased renal

cortex with multiple cysts, the largest cyst was 3.4, multiplied by 3.4, multiplied by

2.6 cm.  The left kidney decreased renal cortex with multiple cysts, large cyst was

3.6, multiplied by 3.2 x 2.7 cm.  Otherwise, the liver length is 15.9 and common bile

duct normal and the spleen is 11 cm.  The conclusion that hepatic steatosis and renal

parenchymal thinning and renal cystic change, probably he may need to see the

nephrologist as outpatient..  With the current laboratory still white count is 5.7 with

hemoglobin 11.4, hematocrit 35.8 on 02/14/2018.  His sodium 133, and BUN of 65 and

creatinine 1.19.  With the adjustment of the dexamethasone by Dr. Patiño as he saw the

patient, his sugar is improving significantly.  Currently magnesium is normal, too.

Patient had hepatitis panel, found that his hepatitis IgM antibody was reactive.  For

that purpose, we will be consulting Dr. Juárez to further evaluate further treatment if

needed or just observation.  Meanwhile, because of the rhonchi is increased and the

hypotension, we decreased his medication to the carvedilol down to 6.25 with the

hypotension twice a day, the carvedilol twice a day was 12.5 as well as we decreased

the losartan to 25 mg p.o. daily.  He has benign prostatic hypertrophy and continued

with the Tamsulosin which is 0.4 mg at bedtime.  As patient requesting rehabilitation

as inpatient, we consulted Dr. Chandler, the podiatrist and MD rehabilitation to evaluate

the patient and possible inpatient rehab.  Meanwhile, the chest x-ray was ordered and

result is pending and this will be seen by Dr. Hudson, Oncology, Hematology, also seen by

Dr. Casey Walden, the radiation oncologist and seen by the Cardiology, Dr. Carter

and I think that could be the complete answers for the questionnaire for documentation

was requested as well by Gabby El RN, CC DS on Mr. Cedrick Zuluaga. Will plan for

once we have the clarification and patient's stability and hypotension which is now

present, has been improved, we will be referring him to the inpatient rehab if Dr. Chandler accepted him in the unit.





MMODL / IJN: [id number] / Job#: [id number]

## 2018-02-14 NOTE — CDI
Last Revision, December 2017





            Documentation Clarification Form



Date: 2/14/2018 9:54:00 AM

From: Gabby El RN, CCDS

Phone: [phone]

MRN: [id number]

Admit Date: 2/11/2018 4:07:00 PM

Patient Name: Cedrick Zuluaga 

Visit Number: [id number]



ATTENTION: The Clinical Documentation Specialists (CDI) and [facility] Coding Staff 
appreciate your assistance in clarifying documentation. Please respond to the 
clarification below the line at the bottom and electronically sign. The CDI & 
[facility] Coding staff will review the response and follow-up if needed. Please note: 
Queries are made part of the Legal Health Record. If you have any questions, 
please contact the author of this message via ITS.



Dr. Prabhakar Claire



Conflicting documentation has been identified in the medical record, please 
review and provide clarification.



History/Risk Factors:

Chief Complaint: Generalized weakness and SOB

Lung Cancer with mets to the brain receiving Chemo and Radiation, Hepatitis A&B
, Hyperlipidemia, HTN, Acute on chronic renal failure



Clinical Indicators:

Cardiology Consult and Progress Notes: "chest pain approximately one week ago, 
relieved with omeprazole. Troponin 0.2, 0.2, 0.2. Initial EKG shows normal 
sinus rhythm with nonspecific ST-T wave changes in the lateral leads."



Attending H&P and Progress Notes:  Acute non-Q myocardial infarction with 
possibly leak of cardiac enzymes, as cardiac injury could be from stress. 



Attending H&P: "Apparently he had a course of dexamethasone and subsequently 
developed some chest discomfort which stool was described as epigastric and 
chest burning usually with and after food. Patient was apparently started on 
omeprazole which did clear his symptomatology. 



Treatment:

ASA, Lipitor, Lasix, Lopressor, Tyelenol #3, Aldactone, 

Tums PRN



In your opinion what is the most clinically appropriate diagnosis for this 
patient?  



Acute Non-Q Wave MI Ruled in

Acute Non- Q wave MI Ruled Out

Chest pain d/t GERD

Chest pain d/t other specified cause

Other explanation of clinical findings

Unable to determine (no explanation for clinical findings)



Please continue to document in your progress notes and discharge summary in 
order to capture severity of illness and risk of mortality. Include clinical 
findings that support your diagnosis.

___________________________________________________________________
MTDD

## 2018-02-15 LAB
GLUCOSE BLD-MCNC: 125 MG/DL (ref 75–99)
GLUCOSE BLD-MCNC: 208 MG/DL (ref 75–99)
GLUCOSE BLD-MCNC: 230 MG/DL (ref 75–99)
GLUCOSE BLD-MCNC: 96 MG/DL (ref 75–99)

## 2018-02-15 RX ADMIN — OXYCODONE AND ACETAMINOPHEN SCH EACH: 5; 325 TABLET ORAL at 21:41

## 2018-02-15 RX ADMIN — ATORVASTATIN CALCIUM SCH MG: 20 TABLET, FILM COATED ORAL at 21:37

## 2018-02-15 RX ADMIN — CHOLESTYRAMINE SCH GM: 4 POWDER, FOR SUSPENSION ORAL at 09:26

## 2018-02-15 RX ADMIN — IPRATROPIUM BROMIDE AND ALBUTEROL SULFATE SCH ML: .5; 3 SOLUTION RESPIRATORY (INHALATION) at 07:50

## 2018-02-15 RX ADMIN — DEXAMETHASONE SCH MG: 4 TABLET ORAL at 09:25

## 2018-02-15 RX ADMIN — INSULIN ASPART SCH: 100 INJECTION, SOLUTION INTRAVENOUS; SUBCUTANEOUS at 17:26

## 2018-02-15 RX ADMIN — DEXAMETHASONE SCH MG: 4 TABLET ORAL at 17:02

## 2018-02-15 RX ADMIN — CHOLESTYRAMINE SCH GM: 4 POWDER, FOR SUSPENSION ORAL at 17:02

## 2018-02-15 RX ADMIN — IPRATROPIUM BROMIDE AND ALBUTEROL SULFATE SCH ML: .5; 3 SOLUTION RESPIRATORY (INHALATION) at 20:03

## 2018-02-15 RX ADMIN — INSULIN ASPART SCH UNIT: 100 INJECTION, SOLUTION INTRAVENOUS; SUBCUTANEOUS at 06:39

## 2018-02-15 RX ADMIN — INSULIN ASPART SCH UNIT: 100 INJECTION, SOLUTION INTRAVENOUS; SUBCUTANEOUS at 21:42

## 2018-02-15 RX ADMIN — CARVEDILOL SCH MG: 6.25 TABLET, FILM COATED ORAL at 17:03

## 2018-02-15 RX ADMIN — IPRATROPIUM BROMIDE AND ALBUTEROL SULFATE SCH ML: .5; 3 SOLUTION RESPIRATORY (INHALATION) at 11:33

## 2018-02-15 RX ADMIN — DEXAMETHASONE SCH MG: 4 TABLET ORAL at 21:37

## 2018-02-15 RX ADMIN — ASPIRIN 81 MG CHEWABLE TABLET SCH MG: 81 TABLET CHEWABLE at 09:25

## 2018-02-15 RX ADMIN — IPRATROPIUM BROMIDE AND ALBUTEROL SULFATE SCH ML: .5; 3 SOLUTION RESPIRATORY (INHALATION) at 15:22

## 2018-02-15 RX ADMIN — TAMSULOSIN HYDROCHLORIDE SCH MG: 0.4 CAPSULE ORAL at 21:37

## 2018-02-15 RX ADMIN — LOSARTAN POTASSIUM SCH MG: 25 TABLET, FILM COATED ORAL at 09:26

## 2018-02-15 RX ADMIN — OXYCODONE AND ACETAMINOPHEN SCH EACH: 5; 325 TABLET ORAL at 09:33

## 2018-02-15 RX ADMIN — ALLOPURINOL SCH MG: 100 TABLET ORAL at 17:04

## 2018-02-15 RX ADMIN — APIXABAN SCH MG: 5 TABLET, FILM COATED ORAL at 09:25

## 2018-02-15 RX ADMIN — APIXABAN SCH MG: 5 TABLET, FILM COATED ORAL at 21:37

## 2018-02-15 RX ADMIN — CARVEDILOL SCH MG: 6.25 TABLET, FILM COATED ORAL at 06:39

## 2018-02-15 RX ADMIN — INSULIN ASPART SCH: 100 INJECTION, SOLUTION INTRAVENOUS; SUBCUTANEOUS at 17:00

## 2018-02-15 RX ADMIN — OXYCODONE AND ACETAMINOPHEN SCH EACH: 5; 325 TABLET ORAL at 17:08

## 2018-02-15 NOTE — PN
PROGRESS NOTE



DATE OF SERVICE:

02/15/2018.



REASON FOR FOLLOWUP:

1. Acute hepatitis C.

2. Diarrhea.



INTERVAL HISTORY:

The patient is afebrile, has been breathing comfortably.  Denies significant chest

pain.  Occasional cough.  No abdominal pain.  Diarrhea seems to have improved.  No

nausea, vomiting.



EXAMINATION:

Blood pressure is 129/56 with a pulse of 51, temperature 97.4.  He is 96% on room air.

General description is an elderly male lying in bed in no distress.  Respiratory system

unlabored breathing, decreased intensity of breath sounds.  No wheeze.  Heart S1, S2.

Regular rate and rhythm. Abdomen soft.  No tenderness.



LABS:

Hemoglobin 11.4, white count 5.7, BUN of 65, creatinine is 1.19.  Stool studies

requested not performed.



IMPRESSION/PLAN:

1. Patient with hepatitis A that will be treated symptomatically.  The patient seems

    to have clinical improvement.  No evidence of any worsening liver abnormality.

2. Diarrhea seemed to have responded to Questran that will be continued for a short

    course.  Increase oral intake.

Continue supportive care.





MMODL / IJN: [id number] / Job#: [id number]

## 2018-02-15 NOTE — P.DS
Providers


Date of admission: 


02/11/18 16:07





Expected date of discharge: 02/15/18


Attending physician: 


Prabhakar Claire


This is a dictation on discharge summary date of service 02/15/2018.


Disposition: Transferred to [facility] for rehabilitation and 

deconditioning.  And physical therapy.


Final diagnosis #1 acute generalized weakness #2 abnormal troponin with the 

probability of non-Q MI #3 congestive heart failure acute diastolic failure 

with normal systolic function.  #4 acute kidney injury prerenal secondary to 

the congestive heart failure resolved with the underlying chronic kidney 

disease stage III.


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  #6 status 

post treatment of chemotherapy on hold until brain radiation 3 sitting to be 

accomplished.  #7 diabetes mellitus type 2 currently on insulin to scale with 

the hemoglobin A1c was 7.6 probably associated with the steroid patient on 

dexamethasone.  The reason to decrease the vasogenic edema of the brain 

secondary to metastasis.  #8 advanced degenerative arthritis of the spine and 

joint.  #9 COPD responded well to inhalation therapy when necessary if needed.  

#10 hypotension with a history of hypertension and his medication was adjusted 

with the normal blood pressure today.  #11 reactive hepatitis A IgM seen by 

infectious disease Dr. Morales.  With mild elevation of liver enzyme no treatment 

needed at this time however follow-up is needed.  #12 history of hypertension 

and hypertensive heart disease.  #13 chronic kidney disease with the duodenal 

involvement and decrease amount of the cortex and also presence of cysts which 

is chronic.  #14 hepatic steatosis.  #15 paroxysmal atrial fibrillation on 

anticoagulant.





Patient admitted from the ER with the generalized weakness and shortness of 

breath found that he has elevated troponin and elevated pro-BMP subsequently 

admitted to the floor on telemetry monitor with the consultation of cardiology.





Consulting physician: #1 Dr. Dr. Bravo cardiologist #2 Dr. HUDSON hematology 

oncology, #3 Dr. MELANIE TAN radiation oncologist.  #4 Dr. Morales infectious disease 

in regard of positive hepatitis A IgM.  #5 Dr. Chandler physiatrist and 

rehabilitation.





Hospital course


Medication adjusted patient did not have any chest pain or anginal pain and 

monitored by cardiology and the other consultants.  Found that diabetes 

mellitus colored bras insulin and diet as well rehabilitation for his 

generalized weakness and deconditioning, patient had chronic pain as well of 

the back





Patient seen and evaluated face-to-face:


Is conscious alert oriented vital signs stable and his chest is clear no 

wheezes nor rhonchi's and the heart was regular irregularity intermittent only 

with paroxysmal atrial fibrillation and he is on anticoagulation per 

cardiology.  On examination today was normal sinus rhythm.  Abdomen is 

protuberant with weak abdominal wall and no tenderness in the 4 quadrants 

extremities no edema and positive pulses.


Neurologically stable need for central rehabilitation





Assessment patient stable general condition for transfer to [facility] and rehab.


Also the patient in the nursing home.  He will follow up with the consulting 

physician for for further treatment.  As mentioned above


Diet is cardiac diabetic.


Activity per rehabilitation in the nursing home.








Consults: 





 





02/11/18 16:07


Consult Physician Urgent 


   Consulting Provider: Kevin Henriquez


   Consult Reason/Comments: Elevated troponin, non-ST elevation myocardial 

infarction, CHF


   Do you want consulting provider notified?: Yes





02/12/18 09:57


Consult Physician Urgent 


   Consulting Provider: Aime Hudson


   Consult Reason/Comments: lung CA with mets


   Do you want consulting provider notified?: Yes





02/13/18 14:19


Consult Physician Urgent 


   Consulting Provider: Casey Ramachandran


   Consult Reason/Comments: input from DR RAMACHANDRAN to DR Claire for use of 

dextromethasone


   Do you want consulting provider notified?: Yes





02/13/18 17:19


Consult Physician Urgent 


   Consulting Provider: Raine Morales


   Consult Reason/Comments: positive hep A 1Gm


   Do you want consulting provider notified?: Yes





02/14/18 10:42


Consult Physician Urgent 


   Consulting Provider: Leroy Chandler


   Consult Reason/Comments: inpatient rehab,cardiac rehab


   Do you want consulting provider notified?: Yes











Primary care physician: 


Prabhakar Claire





Patient Condition at Discharge: Stable





Plan - Discharge Summary


Discharge Rx Participant: No


New Discharge Prescriptions: 


New


   Aspirin 81 mg PO DAILY  chew


   Carvedilol [Coreg] 6.25 mg PO AC-BID #60 tab


   Cholestyramine (with Sugar) [Questran Packet] 4 gm PO BID@1000,1800 #60 

packet


   Dexamethasone [Hexadrol] 2 mg PO TID #30 tab


   Losartan [Cozaar] 25 mg PO DAILY #60 tab





Continue


   Tamsulosin [Flomax] 0.4 mg PO HS


   Atorvastatin [Lipitor] 20 mg PO HS


   Allopurinol [Zyloprim] 100 mg PO Q48H


   Apixaban [Eliquis] 5 mg PO BID


   oxyCODONE-APAP 5-325MG [Percocet 5-325 mg] 1 tab PO TID





Discontinued


   Losartan Potassium [Cozaar] 100 mg PO DAILY


   Chlorthalidone 25 mg PO DAILY


   amLODIPine [Norvasc] 5 mg PO HS


   Carvedilol [Coreg] 12.5 mg PO BID


Discharge Medication List





Allopurinol [Zyloprim] 100 mg PO Q48H 02/11/18 [History]


Apixaban [Eliquis] 5 mg PO BID 02/11/18 [History]


Atorvastatin [Lipitor] 20 mg PO HS 02/11/18 [History]


Tamsulosin [Flomax] 0.4 mg PO HS 02/11/18 [History]


oxyCODONE-APAP 5-325MG [Percocet 5-325 mg] 1 tab PO TID 02/11/18 [History]


Aspirin 81 mg PO DAILY  chew 02/15/18 [Rx]


Carvedilol [Coreg] 6.25 mg PO AC-BID #60 tab 02/15/18 [Rx]


Cholestyramine (with Sugar) [Questran Packet] 4 gm PO BID@1000,1800 #60 packet 

02/15/18 [Rx]


Dexamethasone [Hexadrol] 2 mg PO TID #30 tab 02/15/18 [Rx]


Losartan [Cozaar] 25 mg PO DAILY #60 tab 02/15/18 [Rx]








Follow up Appointment(s)/Referral(s): 


Aime Hudson MD [STAFF PHYSICIAN] - 1 Week


Prabhakar Claire MD [Primary Care Provider] - 1-2 days


Casey Ramachandran MD [STAFF PHYSICIAN] - 1 Week


Rukhsana Carter MD [STAFF PHYSICIAN] - 1 Week

## 2018-02-16 VITALS — DIASTOLIC BLOOD PRESSURE: 76 MMHG | SYSTOLIC BLOOD PRESSURE: 132 MMHG | HEART RATE: 94 BPM

## 2018-02-16 VITALS — RESPIRATION RATE: 16 BRPM | TEMPERATURE: 96.9 F

## 2018-02-16 LAB
GLUCOSE BLD-MCNC: 125 MG/DL (ref 75–99)
GLUCOSE BLD-MCNC: 137 MG/DL (ref 75–99)

## 2018-02-16 RX ADMIN — ASPIRIN 81 MG CHEWABLE TABLET SCH MG: 81 TABLET CHEWABLE at 08:27

## 2018-02-16 RX ADMIN — CHOLESTYRAMINE SCH GM: 4 POWDER, FOR SUSPENSION ORAL at 08:27

## 2018-02-16 RX ADMIN — IPRATROPIUM BROMIDE AND ALBUTEROL SULFATE SCH ML: .5; 3 SOLUTION RESPIRATORY (INHALATION) at 11:07

## 2018-02-16 RX ADMIN — INSULIN ASPART SCH UNIT: 100 INJECTION, SOLUTION INTRAVENOUS; SUBCUTANEOUS at 06:41

## 2018-02-16 RX ADMIN — LOSARTAN POTASSIUM SCH MG: 25 TABLET, FILM COATED ORAL at 08:27

## 2018-02-16 RX ADMIN — OXYCODONE AND ACETAMINOPHEN SCH EACH: 5; 325 TABLET ORAL at 08:32

## 2018-02-16 RX ADMIN — DEXAMETHASONE SCH MG: 4 TABLET ORAL at 08:27

## 2018-02-16 RX ADMIN — APIXABAN SCH MG: 5 TABLET, FILM COATED ORAL at 08:27

## 2018-02-16 RX ADMIN — IPRATROPIUM BROMIDE AND ALBUTEROL SULFATE SCH ML: .5; 3 SOLUTION RESPIRATORY (INHALATION) at 07:42

## 2018-02-16 RX ADMIN — INSULIN ASPART SCH: 100 INJECTION, SOLUTION INTRAVENOUS; SUBCUTANEOUS at 12:06

## 2018-02-16 RX ADMIN — CARVEDILOL SCH MG: 6.25 TABLET, FILM COATED ORAL at 06:41

## 2018-02-16 NOTE — PN
PROGRESS NOTE



DATE OF SERVICE:

02/16/2018



REASON FOR FOLLOWUP:

1. Acute hepatitis A.

2. Diarrhea.



INTERVAL HISTORY:

The patient is afebrile, has been breathing comfortably.  Denies having any 
chest pain.

Occasional cough. No abdominal pain.  His diarrhea has resolved. Has had no 
bowel

movement since yesterday.



PHYSICAL EXAMINATION:

On examination, blood pressure 132/76 with a pulse of 94, temperature of 98.  
He is

100% on room air.

General description is an elderly male up in the bed, in no distress.

RESPIRATORY SYSTEM:  Unlabored breathing with decreased breath sounds at the 
bases. No

wheeze.

HEART: S1, S2.  Regular rate and rhythm.

ABDOMEN: Soft, no tenderness.



LABS:

No new lab has been obtained today.



DIAGNOSTIC IMPRESSION AND PLAN:

1. Patient with acute hepatitis A that he has back in December, currently

    resolving.  Treatment is symptomatic.

2. Patient with diarrhea responding to symptomatic treatment with Questran, 
however,

    now with constipation, no bowel movement for 24 hours.  Discontinue the 
Questran.

Continue with supportive care.  Family present at bedside.  Their questions were

answered.





MMODL / IJN: [id number] / Job#: [id number]

DANA